# Patient Record
Sex: MALE | Race: WHITE | Employment: OTHER | URBAN - METROPOLITAN AREA
[De-identification: names, ages, dates, MRNs, and addresses within clinical notes are randomized per-mention and may not be internally consistent; named-entity substitution may affect disease eponyms.]

---

## 2017-02-28 RX ORDER — LANCETS
EACH MISCELLANEOUS
Qty: 1 BOX | Refills: 3 | Status: SHIPPED | OUTPATIENT
Start: 2017-02-28 | End: 2018-07-23

## 2017-02-28 RX ORDER — ASPIRIN 81 MG/1
1 TABLET ORAL DAILY
COMMUNITY
Start: 2014-01-30 | End: 2017-05-22

## 2017-02-28 RX ORDER — CALCIUM CARBONATE/VITAMIN D3 600 MG-10
1 TABLET ORAL 2 TIMES DAILY
COMMUNITY
Start: 2010-07-07 | End: 2018-11-28

## 2017-02-28 RX ORDER — EZETIMIBE 10 MG/1
1 TABLET ORAL DAILY
COMMUNITY
Start: 2007-05-16 | End: 2017-03-06

## 2017-02-28 RX ORDER — PRAVASTATIN SODIUM 80 MG/1
1 TABLET ORAL DAILY
COMMUNITY
Start: 2007-05-16 | End: 2017-05-22

## 2017-02-28 RX ORDER — FENOFIBRATE 160 MG/1
1 TABLET ORAL DAILY
COMMUNITY
Start: 2007-02-19 | End: 2017-07-24

## 2017-02-28 RX ORDER — QUINAPRIL 10 MG/1
1 TABLET ORAL DAILY
COMMUNITY
Start: 2007-01-26 | End: 2017-05-22

## 2017-03-02 RX ORDER — BLOOD SUGAR DIAGNOSTIC, DRUM
STRIP MISCELLANEOUS
Qty: 270 STRIP | Refills: 3 | Status: SHIPPED | OUTPATIENT
Start: 2017-03-02 | End: 2018-07-23

## 2017-03-06 RX ORDER — EZETIMIBE 10 MG/1
TABLET ORAL
Qty: 90 TABLET | Refills: 3 | Status: SHIPPED | OUTPATIENT
Start: 2017-03-06 | End: 2017-08-16

## 2017-03-13 ENCOUNTER — TELEPHONE (OUTPATIENT)
Dept: FAMILY MEDICINE CLINIC | Facility: CLINIC | Age: 82
End: 2017-03-13

## 2017-03-13 RX ORDER — PEN NEEDLE, DIABETIC 31 GX5/16"
NEEDLE, DISPOSABLE MISCELLANEOUS
Refills: 3 | COMMUNITY
Start: 2016-12-29 | End: 2017-03-21

## 2017-03-13 RX ORDER — INSULIN GLARGINE 100 [IU]/ML
INJECTION, SOLUTION SUBCUTANEOUS
Qty: 42 ML | Refills: 3 | Status: SHIPPED | OUTPATIENT
Start: 2017-03-13 | End: 2017-05-22 | Stop reason: DRUGHIGH

## 2017-03-13 RX ORDER — INSULIN LISPRO 100 [IU]/ML
INJECTION, SOLUTION INTRAVENOUS; SUBCUTANEOUS
Refills: 4 | COMMUNITY
Start: 2016-12-26 | End: 2018-01-13

## 2017-03-13 RX ORDER — HYDROCORTISONE AND IODOCHLORHYDROXYQUIN 5; 30 MG/G; MG/G
CREAM TOPICAL
Refills: 0 | COMMUNITY
Start: 2017-01-31 | End: 2017-05-22 | Stop reason: ALTCHOICE

## 2017-03-13 NOTE — TELEPHONE ENCOUNTER
Pt is currently taking 50 units of lantus per day. Dr. Karthikeyan Welch informed. Please sign encounter.

## 2017-03-22 RX ORDER — PEN NEEDLE, DIABETIC 31 GX5/16"
NEEDLE, DISPOSABLE MISCELLANEOUS
Qty: 360 EACH | Refills: 3 | Status: SHIPPED
Start: 2017-03-22 | End: 2018-01-27

## 2017-04-01 ENCOUNTER — LAB ENCOUNTER (OUTPATIENT)
Dept: LAB | Age: 82
End: 2017-04-01
Attending: FAMILY MEDICINE
Payer: MEDICARE

## 2017-04-01 DIAGNOSIS — E20.8 OTHER HYPOPARATHYROIDISM (HCC): ICD-10-CM

## 2017-04-01 DIAGNOSIS — E55.9 VITAMIN D DEFICIENCY, UNSPECIFIED: ICD-10-CM

## 2017-04-01 DIAGNOSIS — E66.3 OVERWEIGHT: ICD-10-CM

## 2017-04-01 DIAGNOSIS — I10 ESSENTIAL (PRIMARY) HYPERTENSION: ICD-10-CM

## 2017-04-01 DIAGNOSIS — N18.30 CHRONIC KIDNEY DISEASE, STAGE III (MODERATE) (HCC): ICD-10-CM

## 2017-04-01 PROCEDURE — 83970 ASSAY OF PARATHORMONE: CPT

## 2017-04-01 PROCEDURE — 81003 URINALYSIS AUTO W/O SCOPE: CPT

## 2017-04-01 PROCEDURE — 80048 BASIC METABOLIC PNL TOTAL CA: CPT

## 2017-04-01 PROCEDURE — 85025 COMPLETE CBC W/AUTO DIFF WBC: CPT

## 2017-04-01 PROCEDURE — 83735 ASSAY OF MAGNESIUM: CPT

## 2017-04-01 PROCEDURE — 84550 ASSAY OF BLOOD/URIC ACID: CPT

## 2017-04-01 PROCEDURE — 82306 VITAMIN D 25 HYDROXY: CPT

## 2017-04-01 PROCEDURE — 82040 ASSAY OF SERUM ALBUMIN: CPT

## 2017-04-01 PROCEDURE — 83036 HEMOGLOBIN GLYCOSYLATED A1C: CPT

## 2017-04-01 PROCEDURE — 84100 ASSAY OF PHOSPHORUS: CPT

## 2017-04-03 ENCOUNTER — TELEPHONE (OUTPATIENT)
Dept: FAMILY MEDICINE CLINIC | Facility: CLINIC | Age: 82
End: 2017-04-03

## 2017-04-03 NOTE — TELEPHONE ENCOUNTER
----- Message from Wilma Bond MD sent at 4/2/2017 11:47 AM CDT -----  A1c is improved to 7.0, was 7.9. Rest of labs look good with exception of a slightly low vitamin D of 29. Please fax all these labs to Dr. Jia Garrett who ordered these tested.

## 2017-05-22 ENCOUNTER — OFFICE VISIT (OUTPATIENT)
Dept: FAMILY MEDICINE CLINIC | Facility: CLINIC | Age: 82
End: 2017-05-22

## 2017-05-22 VITALS
TEMPERATURE: 98 F | HEIGHT: 68 IN | OXYGEN SATURATION: 98 % | RESPIRATION RATE: 24 BRPM | WEIGHT: 238.63 LBS | BODY MASS INDEX: 36.17 KG/M2 | HEART RATE: 60 BPM | SYSTOLIC BLOOD PRESSURE: 136 MMHG | DIASTOLIC BLOOD PRESSURE: 60 MMHG

## 2017-05-22 DIAGNOSIS — J40 BRONCHITIS: Primary | ICD-10-CM

## 2017-05-22 PROBLEM — E66.01 SEVERE OBESITY (BMI 35.0-39.9) WITH COMORBIDITY (HCC): Chronic | Status: ACTIVE | Noted: 2017-05-22

## 2017-05-22 PROBLEM — E66.01 SEVERE OBESITY (BMI 35.0-39.9) WITH COMORBIDITY (HCC): Chronic | Status: RESOLVED | Noted: 2017-05-22 | Resolved: 2017-05-22

## 2017-05-22 PROCEDURE — 99214 OFFICE O/P EST MOD 30 MIN: CPT | Performed by: FAMILY MEDICINE

## 2017-05-22 RX ORDER — AZITHROMYCIN 250 MG/1
TABLET, FILM COATED ORAL
Qty: 6 TABLET | Refills: 0 | Status: SHIPPED | OUTPATIENT
Start: 2017-05-22 | End: 2017-08-16

## 2017-05-22 RX ORDER — EZETIMIBE 10 MG/1
10 TABLET ORAL EVERY EVENING
COMMUNITY
End: 2017-11-15

## 2017-05-22 RX ORDER — NIACIN 500 MG
500 TABLET ORAL DAILY
COMMUNITY
End: 2017-08-16

## 2017-05-22 RX ORDER — FENOFIBRATE 145 MG/1
145 TABLET, COATED ORAL DAILY
COMMUNITY
End: 2019-02-22

## 2017-05-22 RX ORDER — INSULIN LISPRO 100 [IU]/ML
INJECTION, SOLUTION INTRAVENOUS; SUBCUTANEOUS
COMMUNITY
End: 2017-08-16

## 2017-05-22 RX ORDER — QUINAPRIL 10 MG/1
10 TABLET ORAL DAILY
COMMUNITY
End: 2017-10-31

## 2017-05-22 RX ORDER — DIPHENHYDRAMINE HCL 25 MG
25 CAPSULE ORAL
COMMUNITY

## 2017-05-22 RX ORDER — PRAVASTATIN SODIUM 80 MG/1
80 TABLET ORAL EVERY EVENING
COMMUNITY
End: 2017-10-08

## 2017-05-22 RX ORDER — CHOLECALCIFEROL (VITAMIN D3) 50 MCG
2000 TABLET ORAL DAILY
COMMUNITY
End: 2021-08-31

## 2017-05-22 NOTE — PROGRESS NOTES
[facility] [place]  PROGRESS NOTE  Chief Complaint:   Patient presents with:  Cough: 2 months      HPI:   This is a 80year old male coming in for persistent cough for the past 1-2 months.   Patient states that he was feeling well and then noted HgbA1C 7.0 (H) <5.7 %   Estimated Average Glucose 154 (H)  mg/dL   -CBC W/ DIFFERENTIAL   Result Value Ref Range   WBC 5.7 4.0-13.0 x10(3) uL   RBC 5.26 3.80-5.80 x10(6)uL   HGB 15.6 13.0-17.0 g/dL   HCT 47.6 37.0-53.0 %   .0 150.0-450.0 10( HCl (BENADRYL) 25 MG Oral Cap Take 25 mg by mouth. 2 daily-  One cap with dinner and one cap at bedtime Disp:  Rfl:    Multiple Vitamins-Minerals (MULTIVITAMIN ADULT OR) Take by mouth.  One daily Disp:  Rfl:    aspirin 81 MG Oral Tab Take 81 mg by mouth cara Disp:  Rfl:    Fenofibrate 160 MG Oral Tab Take 1 tablet by mouth daily. Disp:  Rfl:       Counseling given: Not Answered       REVIEW OF SYSTEMS:   EENT:  See HPI 2  INTEGUMENTARY:  Denies rashes, itching, skin lesion, or excessive skin dryness.   Sienna Castillo nontender,     ASSESSMENT AND PLAN:   Yosi Hicks was seen today for cough. Diagnoses and all orders for this visit:    Bronchitis    Other orders  -     azithromycin (ZITHROMAX Z-TOBIN) 250 MG Oral Tab; Take two tablets by mouth today, then one tablet daily.

## 2017-07-24 RX ORDER — FENOFIBRATE 160 MG/1
TABLET ORAL
Qty: 90 TABLET | Refills: 0 | Status: SHIPPED | OUTPATIENT
Start: 2017-07-24 | End: 2017-08-16

## 2017-07-28 ENCOUNTER — TELEPHONE (OUTPATIENT)
Dept: FAMILY MEDICINE CLINIC | Facility: CLINIC | Age: 82
End: 2017-07-28

## 2017-07-28 DIAGNOSIS — Z79.4 CONTROLLED TYPE 2 DIABETES MELLITUS WITHOUT COMPLICATION, WITH LONG-TERM CURRENT USE OF INSULIN (HCC): ICD-10-CM

## 2017-07-28 DIAGNOSIS — E11.9 CONTROLLED TYPE 2 DIABETES MELLITUS WITHOUT COMPLICATION, WITH LONG-TERM CURRENT USE OF INSULIN (HCC): ICD-10-CM

## 2017-07-28 DIAGNOSIS — E78.5 HYPERLIPIDEMIA, UNSPECIFIED HYPERLIPIDEMIA TYPE: Primary | ICD-10-CM

## 2017-08-12 ENCOUNTER — APPOINTMENT (OUTPATIENT)
Dept: LAB | Age: 82
End: 2017-08-12
Attending: FAMILY MEDICINE
Payer: MEDICARE

## 2017-08-12 DIAGNOSIS — Z79.4 CONTROLLED TYPE 2 DIABETES MELLITUS WITHOUT COMPLICATION, WITH LONG-TERM CURRENT USE OF INSULIN (HCC): ICD-10-CM

## 2017-08-12 DIAGNOSIS — E11.9 CONTROLLED TYPE 2 DIABETES MELLITUS WITHOUT COMPLICATION, WITH LONG-TERM CURRENT USE OF INSULIN (HCC): ICD-10-CM

## 2017-08-12 DIAGNOSIS — E78.5 HYPERLIPIDEMIA, UNSPECIFIED HYPERLIPIDEMIA TYPE: ICD-10-CM

## 2017-08-12 LAB
ALBUMIN SERPL-MCNC: 3.6 G/DL (ref 3.5–4.8)
ALP LIVER SERPL-CCNC: 50 U/L (ref 45–117)
ALT SERPL-CCNC: 42 U/L (ref 17–63)
AST SERPL-CCNC: 26 U/L (ref 15–41)
BILIRUB SERPL-MCNC: 0.5 MG/DL (ref 0.1–2)
BUN BLD-MCNC: 17 MG/DL (ref 8–20)
CALCIUM BLD-MCNC: 8.9 MG/DL (ref 8.3–10.3)
CHLORIDE: 107 MMOL/L (ref 101–111)
CHOLEST SMN-MCNC: 97 MG/DL (ref ?–200)
CO2: 26 MMOL/L (ref 22–32)
CREAT BLD-MCNC: 1.24 MG/DL (ref 0.7–1.3)
EST. AVERAGE GLUCOSE BLD GHB EST-MCNC: 163 MG/DL (ref 68–126)
GLUCOSE BLD-MCNC: 139 MG/DL (ref 70–99)
HBA1C MFR BLD HPLC: 7.3 % (ref ?–5.7)
HDLC SERPL-MCNC: 33 MG/DL (ref 45–?)
HDLC SERPL: 2.94 {RATIO} (ref ?–4.97)
LDLC SERPL CALC-MCNC: 43 MG/DL (ref ?–130)
LDLC SERPL-MCNC: 21 MG/DL (ref 5–40)
M PROTEIN MFR SERPL ELPH: 6.7 G/DL (ref 6.1–8.3)
NONHDLC SERPL-MCNC: 64 MG/DL (ref ?–130)
POTASSIUM SERPL-SCNC: 4.5 MMOL/L (ref 3.6–5.1)
SODIUM SERPL-SCNC: 141 MMOL/L (ref 136–144)
TRIGLYCERIDES: 107 MG/DL (ref ?–150)

## 2017-08-12 PROCEDURE — 80053 COMPREHEN METABOLIC PANEL: CPT | Performed by: FAMILY MEDICINE

## 2017-08-12 PROCEDURE — 36415 COLL VENOUS BLD VENIPUNCTURE: CPT | Performed by: FAMILY MEDICINE

## 2017-08-12 PROCEDURE — 80061 LIPID PANEL: CPT | Performed by: FAMILY MEDICINE

## 2017-08-12 PROCEDURE — 83036 HEMOGLOBIN GLYCOSYLATED A1C: CPT | Performed by: FAMILY MEDICINE

## 2017-08-16 ENCOUNTER — OFFICE VISIT (OUTPATIENT)
Dept: FAMILY MEDICINE CLINIC | Facility: CLINIC | Age: 82
End: 2017-08-16

## 2017-08-16 VITALS
RESPIRATION RATE: 24 BRPM | DIASTOLIC BLOOD PRESSURE: 72 MMHG | WEIGHT: 240 LBS | TEMPERATURE: 97 F | HEIGHT: 68 IN | SYSTOLIC BLOOD PRESSURE: 138 MMHG | HEART RATE: 64 BPM | BODY MASS INDEX: 36.37 KG/M2

## 2017-08-16 DIAGNOSIS — Z79.4 CONTROLLED TYPE 2 DIABETES MELLITUS WITHOUT COMPLICATION, WITH LONG-TERM CURRENT USE OF INSULIN (HCC): ICD-10-CM

## 2017-08-16 DIAGNOSIS — I25.9 CHRONIC ISCHEMIC HEART DISEASE: ICD-10-CM

## 2017-08-16 DIAGNOSIS — E11.9 CONTROLLED TYPE 2 DIABETES MELLITUS WITHOUT COMPLICATION, WITH LONG-TERM CURRENT USE OF INSULIN (HCC): ICD-10-CM

## 2017-08-16 DIAGNOSIS — E78.5 HYPERLIPIDEMIA, UNSPECIFIED HYPERLIPIDEMIA TYPE: ICD-10-CM

## 2017-08-16 DIAGNOSIS — R35.0 BENIGN PROSTATIC HYPERPLASIA WITH URINARY FREQUENCY: ICD-10-CM

## 2017-08-16 DIAGNOSIS — N40.1 BENIGN PROSTATIC HYPERPLASIA WITH URINARY FREQUENCY: ICD-10-CM

## 2017-08-16 DIAGNOSIS — Z00.00 ENCOUNTER FOR ANNUAL HEALTH EXAMINATION: ICD-10-CM

## 2017-08-16 DIAGNOSIS — N18.30 CHRONIC KIDNEY DISEASE, STAGE III (MODERATE) (HCC): ICD-10-CM

## 2017-08-16 DIAGNOSIS — Z00.00 HEALTH CARE MAINTENANCE: Primary | ICD-10-CM

## 2017-08-16 DIAGNOSIS — I10 ESSENTIAL HYPERTENSION: ICD-10-CM

## 2017-08-16 PROCEDURE — 99397 PER PM REEVAL EST PAT 65+ YR: CPT | Performed by: FAMILY MEDICINE

## 2017-08-16 PROCEDURE — G0439 PPPS, SUBSEQ VISIT: HCPCS | Performed by: FAMILY MEDICINE

## 2017-08-16 PROCEDURE — 96160 PT-FOCUSED HLTH RISK ASSMT: CPT | Performed by: FAMILY MEDICINE

## 2017-08-16 NOTE — PROGRESS NOTES
[address]  PROGRESS NOTE  Chief Complaint:   Patient presents with: Well Adult      HPI:   This is a [de-identified]year old male coming in for general wellness check and recheck of problems of. Overall the patient is been quite stable.   He catrachito 4.5 3.6 - 5.1 mmol/L   Chloride 107 101 - 111 mmol/L   CO2 26.0 22.0 - 32.0 mmol/L   -LIPID PANEL   Result Value Ref Range   Cholesterol, Total 97 <200 mg/dL   Triglycerides 107 <150 mg/dL   HDL Cholesterol 33 (L) >45 mg/dL   LDL Cholesterol 43 <130 mg/dL Take 10 mg by mouth every evening. Disp:  Rfl:    insulin glargine (LANTUS) 100 UNIT/ML Subcutaneous Solution Inject 50 Units into the skin every evening. Disp:  Rfl:    Fenofibrate (TRICOR) 145 MG Oral Tab Take 145 mg by mouth daily.  Disp:  Rfl:    metopr paralysis, ataxia, numbness or tingling in the extremities,change in bowel or bladder control. HEMATOLOGIC:  Denies anemia, bleeding or bruising. LYMPHATICS:  Denies enlarged nodes or history of splenectomy. PSYCHIATRIC:  Denies depression or anxiety. same medications and cardiac rehab  Hypertension: Continue with same medication and watching salt  Hyperlipidemia: Continue with same medications and watching diet  Renal insufficiency: Stay hydrated and avoid NSAIDs  BPH: No treatment at this time as symp

## 2017-08-16 NOTE — PATIENT INSTRUCTIONS
Continue with same medications. Continue with cardiac rehab.   Continue to see a specialist.      Rossana Recio's SCREENING SCHEDULE   Tests on this list are recommended by your physician but may not be covered, or covered at this frequency, by your insu history    Colorectal Cancer Screening Covered up to Age 76     Colonoscopy Screen   Covered every 10 years- more often if abnormal There are no preventive care reminders to display for this patient.  Update Health Maintenance if applicable    Flex Sigmoido with metal- TD and TDaP Not covered by Medicare Part B) No orders found for this or any previous visit.  This may be covered with your prescription benefits, but Medicare does not cover unless Medically needed    Zoster (Not covered by Medicare Part B) No o

## 2017-09-21 NOTE — TELEPHONE ENCOUNTER
Future appt:    Last Appointment:  8/16/2017    Cholesterol, Total (mg/dL)   Date Value   08/12/2017 97   ----------  HDL Cholesterol (mg/dL)   Date Value   08/12/2017 33 (L)   ----------  LDL Cholesterol (mg/dL)   Date Value   08/12/2017 43   ----------

## 2017-10-09 RX ORDER — PRAVASTATIN SODIUM 80 MG/1
TABLET ORAL
Qty: 90 TABLET | Refills: 2 | Status: SHIPPED | OUTPATIENT
Start: 2017-10-09 | End: 2018-07-16

## 2017-10-09 NOTE — TELEPHONE ENCOUNTER
Future appt:  None   Last Appointment:  8/16/2017; Return in about 1 year (around 8/16/2018).      Cholesterol, Total (mg/dL)   Date Value   08/12/2017 97   ----------  HDL Cholesterol (mg/dL)   Date Value   08/12/2017 33 (L)   ----------  LDL Cholesterol (

## 2017-10-16 RX ORDER — FENOFIBRATE 160 MG/1
TABLET ORAL
Qty: 90 TABLET | Refills: 3 | Status: SHIPPED | OUTPATIENT
Start: 2017-10-16 | End: 2018-08-22

## 2017-10-16 NOTE — TELEPHONE ENCOUNTER
Future appt:     Your appointments     Date & Time Appointment Department [facility])    Oct 17, 2017  8:00 AM CDT Laboratory Visit with REF Driss Wills Reference [facility] ([facility])        Romina Nolen Reference Lab  ED[facility] Ref Lab [address]

## 2017-10-17 ENCOUNTER — LABORATORY ENCOUNTER (OUTPATIENT)
Dept: LAB | Age: 82
End: 2017-10-17
Attending: FAMILY MEDICINE
Payer: MEDICARE

## 2017-10-17 DIAGNOSIS — E20.8 OTHER HYPOPARATHYROIDISM (HCC): ICD-10-CM

## 2017-10-17 DIAGNOSIS — N18.30 CHRONIC KIDNEY DISEASE, STAGE III (MODERATE) (HCC): Primary | ICD-10-CM

## 2017-10-17 DIAGNOSIS — E55.9 AVITAMINOSIS D: ICD-10-CM

## 2017-10-17 DIAGNOSIS — I10 ESSENTIAL HYPERTENSION, MALIGNANT: ICD-10-CM

## 2017-10-17 DIAGNOSIS — E66.3 SEVERELY OVERWEIGHT: ICD-10-CM

## 2017-10-17 PROCEDURE — 83970 ASSAY OF PARATHORMONE: CPT

## 2017-10-17 PROCEDURE — 84550 ASSAY OF BLOOD/URIC ACID: CPT

## 2017-10-17 PROCEDURE — 36415 COLL VENOUS BLD VENIPUNCTURE: CPT

## 2017-10-17 PROCEDURE — 85025 COMPLETE CBC W/AUTO DIFF WBC: CPT

## 2017-10-17 PROCEDURE — 83735 ASSAY OF MAGNESIUM: CPT

## 2017-10-17 PROCEDURE — 82040 ASSAY OF SERUM ALBUMIN: CPT

## 2017-10-17 PROCEDURE — 81003 URINALYSIS AUTO W/O SCOPE: CPT

## 2017-10-17 PROCEDURE — 84100 ASSAY OF PHOSPHORUS: CPT

## 2017-10-17 PROCEDURE — 80048 BASIC METABOLIC PNL TOTAL CA: CPT

## 2017-11-01 RX ORDER — QUINAPRIL 10 MG/1
TABLET ORAL
Qty: 90 TABLET | Refills: 3 | Status: SHIPPED | OUTPATIENT
Start: 2017-11-01 | End: 2018-10-23

## 2017-11-15 RX ORDER — EZETIMIBE 10 MG/1
10 TABLET ORAL EVERY EVENING
Qty: 90 TABLET | Refills: 3 | Status: SHIPPED | OUTPATIENT
Start: 2017-11-15 | End: 2018-11-29

## 2018-01-13 RX ORDER — INSULIN LISPRO 100 [IU]/ML
INJECTION, SOLUTION INTRAVENOUS; SUBCUTANEOUS
Qty: 105 ML | Refills: 3 | Status: SHIPPED | OUTPATIENT
Start: 2018-01-13 | End: 2018-11-28

## 2018-01-29 RX ORDER — PEN NEEDLE, DIABETIC 31 GX5/16"
NEEDLE, DISPOSABLE MISCELLANEOUS
Qty: 400 EACH | Refills: 3 | Status: SHIPPED | OUTPATIENT
Start: 2018-01-29 | End: 2019-02-25

## 2018-02-10 RX ORDER — INSULIN GLARGINE 100 [IU]/ML
INJECTION, SOLUTION SUBCUTANEOUS
Qty: 45 ML | Refills: 2 | Status: SHIPPED | OUTPATIENT
Start: 2018-02-10 | End: 2018-10-17

## 2018-02-20 ENCOUNTER — TELEPHONE (OUTPATIENT)
Dept: FAMILY MEDICINE CLINIC | Facility: CLINIC | Age: 83
End: 2018-02-20

## 2018-02-20 NOTE — TELEPHONE ENCOUNTER
Patient received Computer Phone Call to schedule appt. Is not due for Physical until [facility] August 2018.   Raine Kirby, 02/20/18, 11:37 AM

## 2018-05-01 ENCOUNTER — LABORATORY ENCOUNTER (OUTPATIENT)
Dept: LAB | Age: 83
End: 2018-05-01
Attending: FAMILY MEDICINE
Payer: MEDICARE

## 2018-05-01 DIAGNOSIS — N18.30 CHRONIC KIDNEY DISEASE, STAGE III (MODERATE) (HCC): Primary | ICD-10-CM

## 2018-05-01 DIAGNOSIS — I10 ESSENTIAL HYPERTENSION: ICD-10-CM

## 2018-05-01 DIAGNOSIS — E20.8 OTHER HYPOPARATHYROIDISM (HCC): ICD-10-CM

## 2018-05-01 DIAGNOSIS — E66.3 OVER WEIGHT: ICD-10-CM

## 2018-05-01 DIAGNOSIS — E55.9 VITAMIN D DEFICIENCY: ICD-10-CM

## 2018-05-01 PROCEDURE — 83735 ASSAY OF MAGNESIUM: CPT

## 2018-05-01 PROCEDURE — 85025 COMPLETE CBC W/AUTO DIFF WBC: CPT

## 2018-05-01 PROCEDURE — 84100 ASSAY OF PHOSPHORUS: CPT

## 2018-05-01 PROCEDURE — 80048 BASIC METABOLIC PNL TOTAL CA: CPT

## 2018-05-01 PROCEDURE — 81003 URINALYSIS AUTO W/O SCOPE: CPT

## 2018-05-01 PROCEDURE — 82040 ASSAY OF SERUM ALBUMIN: CPT

## 2018-05-01 PROCEDURE — 84550 ASSAY OF BLOOD/URIC ACID: CPT

## 2018-05-01 PROCEDURE — 83970 ASSAY OF PARATHORMONE: CPT

## 2018-05-01 PROCEDURE — 36415 COLL VENOUS BLD VENIPUNCTURE: CPT

## 2018-06-11 ENCOUNTER — TELEPHONE (OUTPATIENT)
Dept: FAMILY MEDICINE CLINIC | Facility: CLINIC | Age: 83
End: 2018-06-11

## 2018-06-11 DIAGNOSIS — Z79.4 CONTROLLED TYPE 2 DIABETES MELLITUS WITHOUT COMPLICATION, WITH LONG-TERM CURRENT USE OF INSULIN (HCC): Primary | ICD-10-CM

## 2018-06-11 DIAGNOSIS — E11.9 CONTROLLED TYPE 2 DIABETES MELLITUS WITHOUT COMPLICATION, WITH LONG-TERM CURRENT USE OF INSULIN (HCC): Primary | ICD-10-CM

## 2018-06-11 DIAGNOSIS — E78.5 HYPERLIPIDEMIA, UNSPECIFIED HYPERLIPIDEMIA TYPE: ICD-10-CM

## 2018-06-11 DIAGNOSIS — N40.1 BENIGN PROSTATIC HYPERPLASIA WITH LOWER URINARY TRACT SYMPTOMS, SYMPTOM DETAILS UNSPECIFIED: ICD-10-CM

## 2018-06-11 DIAGNOSIS — I10 ESSENTIAL HYPERTENSION: ICD-10-CM

## 2018-07-16 RX ORDER — PRAVASTATIN SODIUM 80 MG/1
TABLET ORAL
Qty: 90 TABLET | Refills: 1 | Status: SHIPPED | OUTPATIENT
Start: 2018-07-16 | End: 2019-01-11

## 2018-07-16 NOTE — TELEPHONE ENCOUNTER
Future appt:     Your appointments     Date & Time Appointment Department [facility])    Aug 16, 2018  8:15 AM CDT Laboratory Visit with REF Jessa Patel Reference Lab ([facility] Ref Lab [facility])    Aug 22, 2018  1:30 PM CDT MA Supervisit with Kinza Rajan,

## 2018-07-23 RX ORDER — LANCETS
EACH MISCELLANEOUS
Qty: 300 EACH | Refills: 1 | Status: SHIPPED
Start: 2018-07-23 | End: 2018-07-24

## 2018-07-23 RX ORDER — BLOOD SUGAR DIAGNOSTIC, DRUM
STRIP MISCELLANEOUS
Qty: 300 STRIP | Refills: 1 | Status: SHIPPED | OUTPATIENT
Start: 2018-07-23 | End: 2018-07-24

## 2018-07-23 NOTE — TELEPHONE ENCOUNTER
Future appt:     Your appointments     Date & Time Appointment Department [facility])    Aug 16, 2018  8:15 AM CDT Laboratory Visit with REF Chuy Oliveira Reference Lab ([facility] Ref Lab [facility])    Aug 22, 2018  1:30 PM CDT MA Supervisit with Hayes Christensen,

## 2018-07-24 ENCOUNTER — TELEPHONE (OUTPATIENT)
Dept: FAMILY MEDICINE CLINIC | Facility: CLINIC | Age: 83
End: 2018-07-24

## 2018-07-24 RX ORDER — BLOOD SUGAR DIAGNOSTIC, DRUM
STRIP MISCELLANEOUS
Qty: 300 STRIP | Refills: 1 | Status: SHIPPED | OUTPATIENT
Start: 2018-07-24 | End: 2019-08-28

## 2018-07-24 RX ORDER — LANCETS
EACH MISCELLANEOUS
Qty: 300 EACH | Refills: 1 | Status: SHIPPED | OUTPATIENT
Start: 2018-07-24 | End: 2019-08-28

## 2018-07-24 NOTE — TELEPHONE ENCOUNTER
Future appt:     Your appointments     Date & Time Appointment Department [facility])    Aug 16, 2018  8:15 AM CDT Laboratory Visit with REF Ramya Underwood Reference Lab (EDW Ref Lab [facility])    Aug 22, 2018  1:30 PM CDT MA Supervisit with Romaine Hernandez,

## 2018-07-28 NOTE — TELEPHONE ENCOUNTER
Future appt:     Your appointments     Date & Time Appointment Department [facility])    Aug 16, 2018  8:15 AM CDT Laboratory Visit with REF Sandra Rainey Reference Lab ([facility] Ref Lab [facility])    Aug 22, 2018  1:30 PM CDT MA Supervisit with Sarah Pardo,

## 2018-08-16 ENCOUNTER — LABORATORY ENCOUNTER (OUTPATIENT)
Dept: LAB | Age: 83
End: 2018-08-16
Attending: FAMILY MEDICINE
Payer: MEDICARE

## 2018-08-16 DIAGNOSIS — Z79.4 CONTROLLED TYPE 2 DIABETES MELLITUS WITHOUT COMPLICATION, WITH LONG-TERM CURRENT USE OF INSULIN (HCC): ICD-10-CM

## 2018-08-16 DIAGNOSIS — I10 ESSENTIAL HYPERTENSION: ICD-10-CM

## 2018-08-16 DIAGNOSIS — R79.89 ELEVATED TSH: ICD-10-CM

## 2018-08-16 DIAGNOSIS — E78.5 HYPERLIPIDEMIA, UNSPECIFIED HYPERLIPIDEMIA TYPE: ICD-10-CM

## 2018-08-16 DIAGNOSIS — N40.1 BENIGN PROSTATIC HYPERPLASIA WITH LOWER URINARY TRACT SYMPTOMS, SYMPTOM DETAILS UNSPECIFIED: ICD-10-CM

## 2018-08-16 DIAGNOSIS — E11.9 CONTROLLED TYPE 2 DIABETES MELLITUS WITHOUT COMPLICATION, WITH LONG-TERM CURRENT USE OF INSULIN (HCC): ICD-10-CM

## 2018-08-16 LAB
ALBUMIN SERPL-MCNC: 3.7 G/DL (ref 3.5–4.8)
ALBUMIN/GLOB SERPL: 1.2 {RATIO} (ref 1–2)
ALP LIVER SERPL-CCNC: 50 U/L (ref 45–117)
ALT SERPL-CCNC: 44 U/L (ref 17–63)
ANION GAP SERPL CALC-SCNC: 7 MMOL/L (ref 0–18)
AST SERPL-CCNC: 26 U/L (ref 15–41)
BASOPHILS # BLD AUTO: 0.05 X10(3) UL (ref 0–0.1)
BASOPHILS NFR BLD AUTO: 0.8 %
BILIRUB SERPL-MCNC: 0.5 MG/DL (ref 0.1–2)
BILIRUB UR QL STRIP.AUTO: NEGATIVE
BUN BLD-MCNC: 18 MG/DL (ref 8–20)
BUN/CREAT SERPL: 14 (ref 10–20)
CALCIUM BLD-MCNC: 9 MG/DL (ref 8.3–10.3)
CHLORIDE SERPL-SCNC: 105 MMOL/L (ref 101–111)
CHOLEST SMN-MCNC: 118 MG/DL (ref ?–200)
CLARITY UR REFRACT.AUTO: CLEAR
CO2 SERPL-SCNC: 27 MMOL/L (ref 22–32)
COLOR UR AUTO: YELLOW
CREAT BLD-MCNC: 1.29 MG/DL (ref 0.7–1.3)
CREAT UR-SCNC: 157 MG/DL
EOSINOPHIL # BLD AUTO: 0.12 X10(3) UL (ref 0–0.3)
EOSINOPHIL NFR BLD AUTO: 2 %
ERYTHROCYTE [DISTWIDTH] IN BLOOD BY AUTOMATED COUNT: 13.3 % (ref 11.5–16)
EST. AVERAGE GLUCOSE BLD GHB EST-MCNC: 160 MG/DL (ref 68–126)
GLOBULIN PLAS-MCNC: 3.2 G/DL (ref 2.5–4)
GLUCOSE BLD-MCNC: 148 MG/DL (ref 70–99)
GLUCOSE UR STRIP.AUTO-MCNC: NEGATIVE MG/DL
HBA1C MFR BLD HPLC: 7.2 % (ref ?–5.7)
HCT VFR BLD AUTO: 50.9 % (ref 37–53)
HDLC SERPL-MCNC: 33 MG/DL (ref 40–59)
HGB BLD-MCNC: 16.5 G/DL (ref 13–17)
IMMATURE GRANULOCYTE COUNT: 0.03 X10(3) UL (ref 0–1)
IMMATURE GRANULOCYTE RATIO %: 0.5 %
KETONES UR STRIP.AUTO-MCNC: NEGATIVE MG/DL
LDLC SERPL CALC-MCNC: 57 MG/DL (ref ?–100)
LEUKOCYTE ESTERASE UR QL STRIP.AUTO: NEGATIVE
LYMPHOCYTES # BLD AUTO: 1.43 X10(3) UL (ref 0.9–4)
LYMPHOCYTES NFR BLD AUTO: 23.9 %
M PROTEIN MFR SERPL ELPH: 6.9 G/DL (ref 6.1–8.3)
MCH RBC QN AUTO: 29.5 PG (ref 27–33.2)
MCHC RBC AUTO-ENTMCNC: 32.4 G/DL (ref 31–37)
MCV RBC AUTO: 91.1 FL (ref 80–99)
MICROALBUMIN UR-MCNC: 0.68 MG/DL
MICROALBUMIN/CREAT 24H UR-RTO: 4.3 UG/MG (ref ?–30)
MONOCYTES # BLD AUTO: 0.56 X10(3) UL (ref 0.1–1)
MONOCYTES NFR BLD AUTO: 9.4 %
NEUTROPHIL ABS PRELIM: 3.79 X10 (3) UL (ref 1.3–6.7)
NEUTROPHILS # BLD AUTO: 3.79 X10(3) UL (ref 1.3–6.7)
NEUTROPHILS NFR BLD AUTO: 63.4 %
NITRITE UR QL STRIP.AUTO: NEGATIVE
NONHDLC SERPL-MCNC: 85 MG/DL (ref ?–130)
OSMOLALITY SERPL CALC.SUM OF ELEC: 293 MOSM/KG (ref 275–295)
PH UR STRIP.AUTO: 6 [PH] (ref 4.5–8)
PLATELET # BLD AUTO: 199 10(3)UL (ref 150–450)
POTASSIUM SERPL-SCNC: 4.6 MMOL/L (ref 3.6–5.1)
PROT UR STRIP.AUTO-MCNC: NEGATIVE MG/DL
PSA SERPL-MCNC: 0.32 NG/ML (ref 0.01–4)
RBC # BLD AUTO: 5.59 X10(6)UL (ref 3.8–5.8)
RBC UR QL AUTO: NEGATIVE
RED CELL DISTRIBUTION WIDTH-SD: 44.5 FL (ref 35.1–46.3)
SODIUM SERPL-SCNC: 139 MMOL/L (ref 136–144)
SP GR UR STRIP.AUTO: 1.02 (ref 1–1.03)
TRIGL SERPL-MCNC: 139 MG/DL (ref 30–149)
TSI SER-ACNC: 5.66 MIU/ML (ref 0.35–5.5)
UROBILINOGEN UR STRIP.AUTO-MCNC: <2 MG/DL
VLDLC SERPL CALC-MCNC: 28 MG/DL (ref 0–30)
WBC # BLD AUTO: 6 X10(3) UL (ref 4–13)

## 2018-08-16 PROCEDURE — 80053 COMPREHEN METABOLIC PANEL: CPT

## 2018-08-16 PROCEDURE — 83036 HEMOGLOBIN GLYCOSYLATED A1C: CPT

## 2018-08-16 PROCEDURE — 36415 COLL VENOUS BLD VENIPUNCTURE: CPT

## 2018-08-16 PROCEDURE — 82043 UR ALBUMIN QUANTITATIVE: CPT

## 2018-08-16 PROCEDURE — 81003 URINALYSIS AUTO W/O SCOPE: CPT

## 2018-08-16 PROCEDURE — 80061 LIPID PANEL: CPT

## 2018-08-16 PROCEDURE — 82570 ASSAY OF URINE CREATININE: CPT

## 2018-08-16 PROCEDURE — 85025 COMPLETE CBC W/AUTO DIFF WBC: CPT

## 2018-08-16 PROCEDURE — 84443 ASSAY THYROID STIM HORMONE: CPT

## 2018-08-16 PROCEDURE — 84153 ASSAY OF PSA TOTAL: CPT

## 2018-08-16 PROCEDURE — 84439 ASSAY OF FREE THYROXINE: CPT

## 2018-08-17 ENCOUNTER — MED REC SCAN ONLY (OUTPATIENT)
Dept: FAMILY MEDICINE CLINIC | Facility: CLINIC | Age: 83
End: 2018-08-17

## 2018-08-17 DIAGNOSIS — R79.89 ELEVATED TSH: Primary | ICD-10-CM

## 2018-08-17 LAB — T4 FREE SERPL-MCNC: 0.8 NG/DL (ref 0.9–1.8)

## 2018-08-22 ENCOUNTER — OFFICE VISIT (OUTPATIENT)
Dept: FAMILY MEDICINE CLINIC | Facility: CLINIC | Age: 83
End: 2018-08-22
Payer: COMMERCIAL

## 2018-08-22 VITALS
DIASTOLIC BLOOD PRESSURE: 58 MMHG | HEART RATE: 64 BPM | RESPIRATION RATE: 18 BRPM | HEIGHT: 68 IN | TEMPERATURE: 98 F | SYSTOLIC BLOOD PRESSURE: 118 MMHG | WEIGHT: 240 LBS | BODY MASS INDEX: 36.37 KG/M2

## 2018-08-22 DIAGNOSIS — N18.30 CHRONIC KIDNEY DISEASE, STAGE III (MODERATE) (HCC): ICD-10-CM

## 2018-08-22 DIAGNOSIS — N40.1 BENIGN PROSTATIC HYPERPLASIA WITH LOWER URINARY TRACT SYMPTOMS, SYMPTOM DETAILS UNSPECIFIED: ICD-10-CM

## 2018-08-22 DIAGNOSIS — Z00.00 HEALTH CARE MAINTENANCE: Primary | ICD-10-CM

## 2018-08-22 DIAGNOSIS — E11.9 CONTROLLED TYPE 2 DIABETES MELLITUS WITHOUT COMPLICATION, WITH LONG-TERM CURRENT USE OF INSULIN (HCC): ICD-10-CM

## 2018-08-22 DIAGNOSIS — I10 ESSENTIAL HYPERTENSION: ICD-10-CM

## 2018-08-22 DIAGNOSIS — Z79.4 CONTROLLED TYPE 2 DIABETES MELLITUS WITHOUT COMPLICATION, WITH LONG-TERM CURRENT USE OF INSULIN (HCC): ICD-10-CM

## 2018-08-22 DIAGNOSIS — I25.9 CHRONIC ISCHEMIC HEART DISEASE: ICD-10-CM

## 2018-08-22 DIAGNOSIS — Z00.00 ENCOUNTER FOR ANNUAL HEALTH EXAMINATION: ICD-10-CM

## 2018-08-22 DIAGNOSIS — E03.9 HYPOTHYROIDISM, UNSPECIFIED TYPE: ICD-10-CM

## 2018-08-22 DIAGNOSIS — E78.5 HYPERLIPIDEMIA, UNSPECIFIED HYPERLIPIDEMIA TYPE: ICD-10-CM

## 2018-08-22 PROBLEM — E66.3 SEVERELY OVERWEIGHT: Status: ACTIVE | Noted: 2018-08-22

## 2018-08-22 PROCEDURE — G0439 PPPS, SUBSEQ VISIT: HCPCS | Performed by: FAMILY MEDICINE

## 2018-08-22 PROCEDURE — 99397 PER PM REEVAL EST PAT 65+ YR: CPT | Performed by: FAMILY MEDICINE

## 2018-08-22 PROCEDURE — 96160 PT-FOCUSED HLTH RISK ASSMT: CPT | Performed by: FAMILY MEDICINE

## 2018-08-22 RX ORDER — NIACIN 500 MG
500 TABLET ORAL
COMMUNITY
End: 2019-02-22

## 2018-08-22 RX ORDER — LEVOTHYROXINE SODIUM 0.03 MG/1
TABLET ORAL
Qty: 90 TABLET | Refills: 1 | Status: SHIPPED | OUTPATIENT
Start: 2018-08-22 | End: 2018-11-08

## 2018-08-22 NOTE — PATIENT INSTRUCTIONS
Continue with same medication. Begin thyroid medication. Recheck thyroid levels in 2 months.     Orlando Recio's SCREENING SCHEDULE   Tests on this list are recommended by your physician but may not be covered, or covered at this frequency, by your insu history    Colorectal Cancer Screening Covered up to Age 76     Colonoscopy Screen   Covered every 10 years- more often if abnormal There are no preventive care reminders to display for this patient.  Update Health Maintenance if applicable    Flex Sigmoido with metal- TD and TDaP Not covered by Medicare Part B) No orders found for this or any previous visit.  This may be covered with your prescription benefits, but Medicare does not cover unless Medically needed    Zoster (Not covered by Medicare Part B) No o

## 2018-08-22 NOTE — PROGRESS NOTES
[facility] [place]  PROGRESS NOTE  Chief Complaint:   Patient presents with:  Physical      HPI:   This is a 82year old male coming in for general wellness and recheck of problems. This patient is doing well overall.   His history of coronary a mg/dL   -MICROALB/CREAT RATIO, RANDOM URINE   Result Value Ref Range   Microalbumin, Urine 0.68 mg/dL   Creatinine Ur Random 157.00 mg/dL   Malb/Cre Calc 4.3 <=30.0 ug/mg   -ASSAY, THYROID STIM HORMONE   Result Value Ref Range   TSH 5.660 (H) 0.350 - 5.500 Hyperlipidemia    • Hypertension    • Renal insufficiency      Past Surgical History:  No date: CATARACTS, OPHTHM ([facility])  2008: COLONOSCOPY  No date: OTHER      Comment: BCC with grafting face  No date: TONSILLECTOMY  Social History:  Smoking status: Never Subcutaneous Solution Pen-injector INJECT 30 UNITS BEFORE EACH MEAL AND 15 UNITS BEFORE SNACK - TOTAL 105 UNITS A DAY (Patient taking differently: INJECT 36 UNITS BEFORE EACH MEAL) Disp: 105 mL Rfl: 3   ezetimibe (ZETIA) 10 MG Oral Tab Take 1 tablet (10 mg blood in stool. MUSCULOSKELETAL:  Denies weakness, muscle aches, back pain, joint pain, swelling or stiffness.   NEUROLOGICAL:  Denies headache, seizures, dizziness, syncope, paralysis, ataxia, numbness or tingling in the extremities,change in bowel or sonny prostatic hyperplasia with lower urinary tract symptoms, symptom details unspecified    Essential hypertension    Chronic ischemic heart disease    Chronic kidney disease, stage III (moderate) (HCC)    Hypothyroidism, unspecified type  -     TSH+FREE T4; F Benign neoplasm of colon     Chronic ischemic heart disease     Diabetes mellitus type II, controlled ([address]     Edema     Familial multiple lipoprotein-type hyperlipidemia     Essential hypertension     Vitamin D deficiency     Severely overweight      CRISTINA

## 2018-09-12 ENCOUNTER — TELEPHONE (OUTPATIENT)
Dept: FAMILY MEDICINE CLINIC | Facility: CLINIC | Age: 83
End: 2018-09-12

## 2018-10-15 RX ORDER — LEVOTHYROXINE SODIUM 0.05 MG/1
50 TABLET ORAL
Qty: 30 TABLET | Refills: 3 | Status: SHIPPED | OUTPATIENT
Start: 2018-10-15 | End: 2018-11-08

## 2018-10-15 RX ORDER — FENOFIBRATE 160 MG/1
TABLET ORAL
Qty: 90 TABLET | Refills: 0 | Status: SHIPPED | OUTPATIENT
Start: 2018-10-15 | End: 2018-11-28

## 2018-10-15 NOTE — TELEPHONE ENCOUNTER
Future appt:     Your appointments     Date & Time Appointment Department [facility])    Nov 07, 2018  8:15 AM CST Laboratory Visit with TITUS Wesley Reference Lab ([facility] Ref Lab [facility])    Feb 22, 2019  2:00 PM CST Follow up with Aide Hung MD

## 2018-10-17 RX ORDER — INSULIN GLARGINE 100 [IU]/ML
INJECTION, SOLUTION SUBCUTANEOUS
Qty: 45 ML | Refills: 1 | Status: SHIPPED | OUTPATIENT
Start: 2018-10-17 | End: 2019-05-31

## 2018-10-17 NOTE — TELEPHONE ENCOUNTER
Future appt:     Your appointments     Date & Time Appointment Department [facility])    Nov 07, 2018  8:15 AM CST Laboratory Visit with REF Ramya Underwood [facility] ([facility] Ref [facility])    Feb 22, 2019  2:00 PM CST Follow up with Radha Porter MD

## 2018-10-23 NOTE — TELEPHONE ENCOUNTER
Future appt:     Your appointments     Date & Time Appointment Department [facility])    Nov 07, 2018  8:15 AM CST Laboratory Visit with REF Sully Price [facility] ([facility])    Feb 22, 2019  2:00 PM CST Follow up with Hansel Leventhal, MD

## 2018-10-24 RX ORDER — QUINAPRIL 10 MG/1
TABLET ORAL
Qty: 90 TABLET | Refills: 0 | Status: SHIPPED | OUTPATIENT
Start: 2018-10-24 | End: 2019-01-25

## 2018-11-07 ENCOUNTER — LABORATORY ENCOUNTER (OUTPATIENT)
Dept: LAB | Age: 83
End: 2018-11-07
Attending: FAMILY MEDICINE
Payer: MEDICARE

## 2018-11-07 DIAGNOSIS — I12.9 RENAL HYPERTENSION: ICD-10-CM

## 2018-11-07 DIAGNOSIS — E55.9 VITAMIN D DEFICIENCY, UNSPECIFIED: ICD-10-CM

## 2018-11-07 DIAGNOSIS — E20.8 OTHER HYPOPARATHYROIDISM (HCC): ICD-10-CM

## 2018-11-07 DIAGNOSIS — E66.3 OVER WEIGHT: ICD-10-CM

## 2018-11-07 DIAGNOSIS — E03.9 HYPOTHYROIDISM, UNSPECIFIED TYPE: ICD-10-CM

## 2018-11-07 DIAGNOSIS — N18.30 CHRONIC KIDNEY DISEASE, STAGE III (MODERATE) (HCC): ICD-10-CM

## 2018-11-07 DIAGNOSIS — I10 ESSENTIAL HYPERTENSION: ICD-10-CM

## 2018-11-07 PROCEDURE — 83735 ASSAY OF MAGNESIUM: CPT

## 2018-11-07 PROCEDURE — 84443 ASSAY THYROID STIM HORMONE: CPT

## 2018-11-07 PROCEDURE — 83970 ASSAY OF PARATHORMONE: CPT

## 2018-11-07 PROCEDURE — 84100 ASSAY OF PHOSPHORUS: CPT

## 2018-11-07 PROCEDURE — 82040 ASSAY OF SERUM ALBUMIN: CPT

## 2018-11-07 PROCEDURE — 84550 ASSAY OF BLOOD/URIC ACID: CPT

## 2018-11-07 PROCEDURE — 84439 ASSAY OF FREE THYROXINE: CPT

## 2018-11-07 PROCEDURE — 85025 COMPLETE CBC W/AUTO DIFF WBC: CPT

## 2018-11-07 PROCEDURE — 36415 COLL VENOUS BLD VENIPUNCTURE: CPT

## 2018-11-07 PROCEDURE — 80048 BASIC METABOLIC PNL TOTAL CA: CPT

## 2018-11-07 PROCEDURE — 81003 URINALYSIS AUTO W/O SCOPE: CPT

## 2018-11-08 ENCOUNTER — TELEPHONE (OUTPATIENT)
Dept: FAMILY MEDICINE CLINIC | Facility: CLINIC | Age: 83
End: 2018-11-08

## 2018-11-08 RX ORDER — LEVOTHYROXINE SODIUM 0.05 MG/1
50 TABLET ORAL
Qty: 90 TABLET | Refills: 1 | Status: SHIPPED | OUTPATIENT
Start: 2018-11-08 | End: 2019-04-24

## 2018-11-08 NOTE — TELEPHONE ENCOUNTER
Informed pt of his blood work results. Pt will need a refill on for the Levothyroxine. Pt states he read the instructions wrong. Pt was suppose to take 25mcg for one week and then 50mcg after that.     When pt received the refill he did not notice

## 2018-11-08 NOTE — TELEPHONE ENCOUNTER
----- Message from Cholo Gomez MD sent at 11/8/2018  7:09 AM CST -----  All labs look very good and normal.  Even kidney function tests are improved. Creatinine is in the normal range at 1.24.   Thyroid levels are now normal with starting levothyroxin

## 2018-11-28 ENCOUNTER — OFFICE VISIT (OUTPATIENT)
Dept: FAMILY MEDICINE CLINIC | Facility: CLINIC | Age: 83
End: 2018-11-28
Payer: COMMERCIAL

## 2018-11-28 VITALS
SYSTOLIC BLOOD PRESSURE: 126 MMHG | HEIGHT: 68 IN | TEMPERATURE: 97 F | WEIGHT: 239.5 LBS | DIASTOLIC BLOOD PRESSURE: 70 MMHG | RESPIRATION RATE: 16 BRPM | HEART RATE: 68 BPM | BODY MASS INDEX: 36.3 KG/M2

## 2018-11-28 DIAGNOSIS — Z79.4 CONTROLLED TYPE 2 DIABETES MELLITUS WITHOUT COMPLICATION, WITH LONG-TERM CURRENT USE OF INSULIN (HCC): ICD-10-CM

## 2018-11-28 DIAGNOSIS — R42 DIZZINESS: Primary | ICD-10-CM

## 2018-11-28 DIAGNOSIS — I10 ESSENTIAL HYPERTENSION: ICD-10-CM

## 2018-11-28 DIAGNOSIS — E11.9 CONTROLLED TYPE 2 DIABETES MELLITUS WITHOUT COMPLICATION, WITH LONG-TERM CURRENT USE OF INSULIN (HCC): ICD-10-CM

## 2018-11-28 PROCEDURE — 99214 OFFICE O/P EST MOD 30 MIN: CPT | Performed by: FAMILY MEDICINE

## 2018-11-28 RX ORDER — INSULIN LISPRO 100 [IU]/ML
38 INJECTION, SOLUTION INTRAVENOUS; SUBCUTANEOUS
COMMUNITY
End: 2019-02-25 | Stop reason: ALTCHOICE

## 2018-11-28 RX ORDER — METOPROLOL SUCCINATE 25 MG/1
25 TABLET, EXTENDED RELEASE ORAL DAILY
COMMUNITY
End: 2019-01-29

## 2018-11-28 NOTE — PROGRESS NOTES
[facility] [place]  PROGRESS NOTE  Chief Complaint:   Patient presents with:  Dizziness      HPI:   This is a 82year old male coming in for dizziness and off-balance sensation for the past few months but seems worse over the past few weeks.   Pa Yellow    Clarity Urine Clear Clear    Spec Gravity 1.018 1.001 - 1.030    Glucose Urine Negative Negative mg/dl    Bilirubin Urine Negative Negative    Ketones Urine Negative Negative mg/dL    Blood Urine Negative Negative    pH Urine 6.0 4.5 - 8.0    Pro History:  Family History   Problem Relation Age of Onset   • Other (CAD) Father          at 47 of an MI   • Breast Cancer Mother         Breast cancer and COPD     Allergies:    Atorvastatin                Comment:Other reaction(s):  Other (see Comments Multiple Vitamins-Minerals (MULTIVITAMIN ADULT OR) Take by mouth. One daily Disp:  Rfl:    aspirin 81 MG Oral Tab Take 81 mg by mouth daily. Disp:  Rfl:    Cholecalciferol (VITAMIN D) 2000 units Oral Tab Take 2,000 Units by mouth daily.  Dr. Farida Rodriguez: Supple, no JVD, no thyromegaly. SKIN: No rashes, no skin lesion, no bruising, good turgor. HEART:  Regular rate and rhythm, no murmurs, rubs or gallops. LUNGS: Clear to auscultation bilterally, no rales/rhonchi/wheezing. CHEST: No tenderness.   ABDOMEN: (moderate) (HCC)     Benign neoplasm of colon     Chronic ischemic heart disease     Diabetes mellitus type II, controlled ([facility] [address]     Edema     Familial multiple lipoprotein-type hyperlipidemia     Essential hypertension     Vitamin D deficiency     Severel

## 2018-11-29 ENCOUNTER — TELEPHONE (OUTPATIENT)
Dept: FAMILY MEDICINE CLINIC | Facility: CLINIC | Age: 83
End: 2018-11-29

## 2018-11-29 RX ORDER — EZETIMIBE 10 MG/1
TABLET ORAL
Qty: 90 TABLET | Refills: 0 | Status: SHIPPED | OUTPATIENT
Start: 2018-11-29 | End: 2019-01-28

## 2018-11-29 RX ORDER — MECLIZINE HCL 12.5 MG/1
12.5 TABLET ORAL 3 TIMES DAILY PRN
Qty: 30 TABLET | Refills: 0 | Status: SHIPPED | OUTPATIENT
Start: 2018-11-29

## 2018-11-29 NOTE — TELEPHONE ENCOUNTER
Patient states his dizziness is worse today. States he was not able to stay at cardiac rehab and when walking out to his car he became very dizzy and needed to be pushed the rest of the way in a wheelchair.   States since he has been home, the dizziness h

## 2018-11-29 NOTE — TELEPHONE ENCOUNTER
That I would like him to rest as much as possible, avoiding x-ray activity. I would like him to try a small dose of meclizine which would help if this is an inner ear disturbance. Prescription sent to [facility] in [place].   Please notify patient

## 2018-11-29 NOTE — TELEPHONE ENCOUNTER
Future appt:     Your appointments     Date & Time Appointment Department [facility])    Feb 22, 2019  2:00 PM CST Follow up with Helena Reid MD [address] [facility] [place] ([address], [facility] SPECIALTY [facility]

## 2018-12-05 ENCOUNTER — TELEPHONE (OUTPATIENT)
Dept: FAMILY MEDICINE CLINIC | Facility: CLINIC | Age: 83
End: 2018-12-05

## 2018-12-05 NOTE — PROGRESS NOTES
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 Discussed with patient. He had previously seen Dr. Edward Bosch and has already been contacted by him. He has been set up with a consult for vascular.

## 2019-01-11 RX ORDER — PRAVASTATIN SODIUM 80 MG/1
TABLET ORAL
Qty: 90 TABLET | Refills: 1 | Status: SHIPPED | OUTPATIENT
Start: 2019-01-11 | End: 2019-07-07

## 2019-01-11 RX ORDER — FENOFIBRATE 160 MG/1
TABLET ORAL
Qty: 90 TABLET | Refills: 1 | Status: SHIPPED | OUTPATIENT
Start: 2019-01-11 | End: 2019-02-22

## 2019-01-11 NOTE — TELEPHONE ENCOUNTER
Future appt:     Your appointments     Date & Time Appointment Department [facility])    Feb 22, 2019  2:00 PM CST Follow up with Conrad Pina MD 25 [facility], [address]

## 2019-01-25 RX ORDER — QUINAPRIL 10 MG/1
10 TABLET ORAL
Qty: 90 TABLET | Refills: 1 | Status: SHIPPED | OUTPATIENT
Start: 2019-01-25 | End: 2019-07-19

## 2019-01-28 ENCOUNTER — TELEPHONE (OUTPATIENT)
Dept: FAMILY MEDICINE CLINIC | Facility: CLINIC | Age: 84
End: 2019-01-28

## 2019-01-28 RX ORDER — EZETIMIBE 10 MG/1
TABLET ORAL
Qty: 90 TABLET | Refills: 1 | Status: SHIPPED | OUTPATIENT
Start: 2019-01-28 | End: 2019-02-22

## 2019-01-28 NOTE — TELEPHONE ENCOUNTER
Received a refill request for Zetia 10mg 1 daily.     Future Appointments   Date Time Provider Myla Gamez   2/22/2019  2:00 PM Dave Shirley MD [facility] [place] [facility]

## 2019-01-29 RX ORDER — METOPROLOL SUCCINATE 25 MG/1
25 TABLET, EXTENDED RELEASE ORAL DAILY
Qty: 90 TABLET | Refills: 1 | Status: SHIPPED | OUTPATIENT
Start: 2019-01-29 | End: 2019-02-01

## 2019-01-29 NOTE — TELEPHONE ENCOUNTER
Future appt:     Your appointments     Date & Time Appointment Department [facility])    Feb 22, 2019  2:00 PM CST Follow up with Dave Shirley MD 25 [facility] [place] ([facility])        [address], [facility] SPECIALTY [facility]

## 2019-02-01 ENCOUNTER — TELEPHONE (OUTPATIENT)
Dept: FAMILY MEDICINE CLINIC | Facility: CLINIC | Age: 84
End: 2019-02-01

## 2019-02-01 RX ORDER — METOPROLOL SUCCINATE 25 MG/1
25 TABLET, EXTENDED RELEASE ORAL DAILY
Qty: 90 TABLET | Refills: 1 | Status: CANCELLED | OUTPATIENT
Start: 2019-02-01

## 2019-02-01 RX ORDER — METOPROLOL SUCCINATE 25 MG/1
25 TABLET, EXTENDED RELEASE ORAL 2 TIMES DAILY
Qty: 180 TABLET | Refills: 1 | Status: SHIPPED | OUTPATIENT
Start: 2019-02-01 | End: 2019-09-01

## 2019-02-13 ENCOUNTER — MED REC SCAN ONLY (OUTPATIENT)
Dept: FAMILY MEDICINE CLINIC | Facility: CLINIC | Age: 84
End: 2019-02-13

## 2019-02-22 ENCOUNTER — OFFICE VISIT (OUTPATIENT)
Dept: FAMILY MEDICINE CLINIC | Facility: CLINIC | Age: 84
End: 2019-02-22
Payer: COMMERCIAL

## 2019-02-22 ENCOUNTER — APPOINTMENT (OUTPATIENT)
Dept: LAB | Age: 84
End: 2019-02-22
Attending: FAMILY MEDICINE
Payer: MEDICARE

## 2019-02-22 VITALS
SYSTOLIC BLOOD PRESSURE: 142 MMHG | HEIGHT: 68 IN | HEART RATE: 74 BPM | DIASTOLIC BLOOD PRESSURE: 62 MMHG | BODY MASS INDEX: 36.65 KG/M2 | RESPIRATION RATE: 18 BRPM | TEMPERATURE: 97 F | WEIGHT: 241.81 LBS

## 2019-02-22 DIAGNOSIS — I25.10 ATHEROSCLEROSIS OF NATIVE CORONARY ARTERY OF NATIVE HEART WITHOUT ANGINA PECTORIS: ICD-10-CM

## 2019-02-22 DIAGNOSIS — E66.3 SEVERELY OVERWEIGHT: ICD-10-CM

## 2019-02-22 DIAGNOSIS — N18.30 CHRONIC RENAL INSUFFICIENCY, STAGE III (MODERATE) (HCC): ICD-10-CM

## 2019-02-22 DIAGNOSIS — I65.23 BILATERAL CAROTID ARTERY OCCLUSION: ICD-10-CM

## 2019-02-22 DIAGNOSIS — I10 ESSENTIAL HYPERTENSION, BENIGN: ICD-10-CM

## 2019-02-22 DIAGNOSIS — N18.30 TYPE 2 DIABETES MELLITUS WITH STAGE 3 CHRONIC KIDNEY DISEASE, WITH LONG-TERM CURRENT USE OF INSULIN (HCC): Primary | ICD-10-CM

## 2019-02-22 DIAGNOSIS — E78.01 FAMILIAL HYPERCHOLESTEROLEMIA: ICD-10-CM

## 2019-02-22 DIAGNOSIS — Z79.4 TYPE 2 DIABETES MELLITUS WITH STAGE 3 CHRONIC KIDNEY DISEASE, WITH LONG-TERM CURRENT USE OF INSULIN (HCC): Primary | ICD-10-CM

## 2019-02-22 DIAGNOSIS — R26.81 UNSTEADY: ICD-10-CM

## 2019-02-22 DIAGNOSIS — E55.9 VITAMIN D DEFICIENCY: ICD-10-CM

## 2019-02-22 DIAGNOSIS — E20.8 OTHER HYPOPARATHYROIDISM (HCC): ICD-10-CM

## 2019-02-22 DIAGNOSIS — E11.22 TYPE 2 DIABETES MELLITUS WITH STAGE 3 CHRONIC KIDNEY DISEASE, WITH LONG-TERM CURRENT USE OF INSULIN (HCC): Primary | ICD-10-CM

## 2019-02-22 PROBLEM — E20.89 OTHER HYPOPARATHYROIDISM: Status: ACTIVE | Noted: 2019-02-22

## 2019-02-22 PROBLEM — I65.29 OCCLUSION OF CAROTID ARTERY: Status: ACTIVE | Noted: 2019-01-18

## 2019-02-22 LAB
ALBUMIN SERPL-MCNC: 3.9 G/DL (ref 3.4–5)
ALBUMIN/GLOB SERPL: 1.2 {RATIO} (ref 1–2)
ALP LIVER SERPL-CCNC: 67 U/L (ref 45–117)
ALT SERPL-CCNC: 51 U/L (ref 16–61)
ANION GAP SERPL CALC-SCNC: 4 MMOL/L (ref 0–18)
AST SERPL-CCNC: 24 U/L (ref 15–37)
BILIRUB SERPL-MCNC: 0.5 MG/DL (ref 0.1–2)
BUN BLD-MCNC: 23 MG/DL (ref 7–18)
BUN/CREAT SERPL: 19 (ref 10–20)
CALCIUM BLD-MCNC: 9.1 MG/DL (ref 8.5–10.1)
CHLORIDE SERPL-SCNC: 104 MMOL/L (ref 98–107)
CO2 SERPL-SCNC: 31 MMOL/L (ref 21–32)
CREAT BLD-MCNC: 1.21 MG/DL (ref 0.7–1.3)
EST. AVERAGE GLUCOSE BLD GHB EST-MCNC: 166 MG/DL (ref 68–126)
GLOBULIN PLAS-MCNC: 3.2 G/DL (ref 2.8–4.4)
GLUCOSE BLD-MCNC: 159 MG/DL (ref 70–99)
HBA1C MFR BLD HPLC: 7.4 % (ref ?–5.7)
M PROTEIN MFR SERPL ELPH: 7.1 G/DL (ref 6.4–8.2)
OSMOLALITY SERPL CALC.SUM OF ELEC: 295 MOSM/KG (ref 275–295)
POTASSIUM SERPL-SCNC: 4.7 MMOL/L (ref 3.5–5.1)
SODIUM SERPL-SCNC: 139 MMOL/L (ref 136–145)

## 2019-02-22 PROCEDURE — 99214 OFFICE O/P EST MOD 30 MIN: CPT | Performed by: FAMILY MEDICINE

## 2019-02-22 PROCEDURE — 36415 COLL VENOUS BLD VENIPUNCTURE: CPT | Performed by: FAMILY MEDICINE

## 2019-02-22 PROCEDURE — 80053 COMPREHEN METABOLIC PANEL: CPT | Performed by: FAMILY MEDICINE

## 2019-02-22 PROCEDURE — 83036 HEMOGLOBIN GLYCOSYLATED A1C: CPT | Performed by: FAMILY MEDICINE

## 2019-02-22 RX ORDER — ASPIRIN 325 MG
325 TABLET ORAL DAILY
COMMUNITY

## 2019-02-22 NOTE — PROGRESS NOTES
[facility] [place]  PROGRESS NOTE  Chief Complaint:   Patient presents with:  Diabetes  Follow - Up      HPI:   This is a 82year old male coming in for follow-up on his diabetes. He has been doing very well with his current treatment regimen. BUN/CREA Ratio 15.2 10.0 - 20.0    Calcium, Total 8.9 8.3 - 10.3 mg/dL    Calculated Osmolality 291 275 - 295 mOsm/kg    GFR, Non- 54 (L) >=60    GFR, -American 62 >=60   MAGNESIUM   Result Value Ref Range    Magnesium 2.0 1.8 - 2 • CAD (coronary artery disease) 2007    Had stents.   Seen by [facility] cardiology   • Diabetes [facility])    • Hyperlipidemia    • Hypertension    • Renal insufficiency      Past Surgical History:   Procedure Laterality Date   • CATARACTS, OPHTHM ([facility])     • C 50 UNITS SUBCUTANEOUSLY EVERY NIGHT AT BEDTIME Disp: 45 mL Rfl: 1   Glucose Blood (ACCU-CHEK COMPACT PLUS) In Vitro Strip 3x daily glucose testing. Dx: E11.9.   Insulin Dependent Disp: 300 strip Rfl: 1   ACCU-CHEK SOFTCLIX LANCETS Does not apply Misc 3x da nodes or history of splenectomy. PSYCHIATRIC:  Denies depression or anxiety. ENDOCRINOLOGIC: He denies any low blood sugar reactions. ALLERGIES:  Denies allergic response, history of asthma, sneezing, hives, eczema or rhinitis.      EXAM:   /62 (BP kidney disease, with long-term current use of insulin ([facility] [address]  He has type 2 diabetes with insulin. He is doing very well at regulating his blood sugar with this combination.   Plan: Check hemoglobin A1c and CMP now and continue the same medications.  - COMP 09/01/2018    Patient/Caregiver Education: Patient/Caregiver Education: There are no barriers to learning. Medical education done. Outcome: Patient verbalizes understanding.  Patient is notified to call with any questions, complications, allergies, or wor

## 2019-02-22 NOTE — PATIENT INSTRUCTIONS
Please stop taking: Zetia, TriCor, and niacin. Continue the other medications as ordered. Labs today.

## 2019-02-25 ENCOUNTER — TELEPHONE (OUTPATIENT)
Dept: FAMILY MEDICINE CLINIC | Facility: CLINIC | Age: 84
End: 2019-02-25

## 2019-02-25 NOTE — TELEPHONE ENCOUNTER
Future appt:     Your appointments     Date & Time Appointment Department [facility])    Aug 21, 2019  8:15 AM CDT Laboratory Visit with REF Farhan Bob [facility] ([facility])    Aug 23, 2019  2:00 PM CDT MA Supervisit with Chung Martell,

## 2019-02-25 NOTE — TELEPHONE ENCOUNTER
----- Message from Estefanía Krause MD sent at 2/25/2019  1:07 PM CST -----  Please call Magen Pavon. His hemoglobin A1c is 7.4. His chemistry profile is normal.  No changes recommended now.

## 2019-02-26 ENCOUNTER — OFFICE VISIT (OUTPATIENT)
Dept: FAMILY MEDICINE CLINIC | Facility: CLINIC | Age: 84
End: 2019-02-26
Payer: COMMERCIAL

## 2019-02-26 VITALS
BODY MASS INDEX: 36 KG/M2 | WEIGHT: 240 LBS | DIASTOLIC BLOOD PRESSURE: 62 MMHG | SYSTOLIC BLOOD PRESSURE: 138 MMHG | HEART RATE: 68 BPM | TEMPERATURE: 97 F | OXYGEN SATURATION: 98 %

## 2019-02-26 DIAGNOSIS — L03.312 CELLULITIS OF BACK EXCEPT BUTTOCK: Primary | ICD-10-CM

## 2019-02-26 PROCEDURE — 87205 SMEAR GRAM STAIN: CPT | Performed by: NURSE PRACTITIONER

## 2019-02-26 PROCEDURE — 99214 OFFICE O/P EST MOD 30 MIN: CPT | Performed by: NURSE PRACTITIONER

## 2019-02-26 PROCEDURE — 87070 CULTURE OTHR SPECIMN AEROBIC: CPT | Performed by: NURSE PRACTITIONER

## 2019-02-26 RX ORDER — DOXYCYCLINE HYCLATE 100 MG/1
100 CAPSULE ORAL 2 TIMES DAILY
Qty: 20 CAPSULE | Refills: 0 | Status: SHIPPED | OUTPATIENT
Start: 2019-02-26 | End: 2019-03-08

## 2019-02-26 NOTE — PROGRESS NOTES
HPI:    Patient ID: Luisa Bonilla is a 82year old male. HPI    Rash to back that is getting worse and moving that started over the weekend. Itching some. Burns a little bit. Now has a blister.    Saw Dr. Mohsen Wheeler Friday and stopped several cholestero Glucose Blood (ACCU-CHEK COMPACT PLUS) In Vitro Strip 3x daily glucose testing. Dx: E11.9. Insulin Dependent Disp: 300 strip Rfl: 1   ACCU-CHEK SOFTCLIX LANCETS Does not apply Misc 3x daily glucose testing.    NPI [id number]    DX E11.9     On insulin Dis Cellulitis of back except buttock  (primary encounter diagnosis)    Orders Placed This Encounter      Aerobic Bacterial Culture [E]      Meds This Visit:  Requested Prescriptions     Signed Prescriptions Disp Refills   • Doxycycline Hyclate 100 MG Oral Cap

## 2019-02-26 NOTE — PATIENT INSTRUCTIONS
Culture pending  Directed to take Doxycyline until gone. Recommend to eat with the antibiotic. Keep \"blistered\" area covered. Return to clinic if not better in 48-72 hours. Otherwise follow-up as needed.

## 2019-02-28 ENCOUNTER — TELEPHONE (OUTPATIENT)
Dept: FAMILY MEDICINE CLINIC | Facility: CLINIC | Age: 84
End: 2019-02-28

## 2019-02-28 DIAGNOSIS — N18.30 CHRONIC KIDNEY DISEASE, STAGE III (MODERATE) (HCC): ICD-10-CM

## 2019-02-28 DIAGNOSIS — R42 DIZZINESS: ICD-10-CM

## 2019-02-28 DIAGNOSIS — E55.9 VITAMIN D DEFICIENCY, UNSPECIFIED: ICD-10-CM

## 2019-02-28 DIAGNOSIS — R60.9 EDEMA: ICD-10-CM

## 2019-02-28 DIAGNOSIS — N18.30 HYPERTENSIVE KIDNEY DISEASE WITH CHRONIC KIDNEY DISEASE STAGE III (HCC): Primary | ICD-10-CM

## 2019-02-28 DIAGNOSIS — I12.9 HYPERTENSIVE KIDNEY DISEASE WITH CHRONIC KIDNEY DISEASE STAGE III (HCC): Primary | ICD-10-CM

## 2019-02-28 DIAGNOSIS — E66.3 PATIENT OVERWEIGHT: ICD-10-CM

## 2019-02-28 DIAGNOSIS — E20.8 OTHER HYPOPARATHYROIDISM (HCC): ICD-10-CM

## 2019-02-28 DIAGNOSIS — I10 ESSENTIAL HYPERTENSION: ICD-10-CM

## 2019-02-28 NOTE — TELEPHONE ENCOUNTER
----- Message from [employer] sent at 2/28/2019  3:59 PM CST -----  Culture is showing no growth. Please see how patient is doing.

## 2019-02-28 NOTE — TELEPHONE ENCOUNTER
Patient informed of the below results. States the area might be a little better but it is not worse. States he has only been taking the medication for about 2.5 days now.   Patient will await final results and c/b if symptoms worsen before results are ivana

## 2019-04-24 RX ORDER — LEVOTHYROXINE SODIUM 0.05 MG/1
50 TABLET ORAL
Qty: 90 TABLET | Refills: 1 | Status: SHIPPED | OUTPATIENT
Start: 2019-04-24 | End: 2019-08-23

## 2019-04-24 NOTE — TELEPHONE ENCOUNTER
Future appt:     Your appointments     Date & Time Appointment Department [facility])    May 16, 2019  8:00 AM CDT Laboratory Visit with REF Windy Case Reference Lab ([facility] Ref [facility])        Aug 21, 2019  8:15 AM CDT Laboratory Visit with Sami Khan

## 2019-05-16 ENCOUNTER — APPOINTMENT (OUTPATIENT)
Dept: LAB | Age: 84
End: 2019-05-16
Attending: FAMILY MEDICINE
Payer: MEDICARE

## 2019-05-16 DIAGNOSIS — R60.9 EDEMA: ICD-10-CM

## 2019-05-16 DIAGNOSIS — N18.30 CHRONIC KIDNEY DISEASE, STAGE III (MODERATE) (HCC): ICD-10-CM

## 2019-05-16 DIAGNOSIS — E66.3 PATIENT OVERWEIGHT: ICD-10-CM

## 2019-05-16 DIAGNOSIS — I12.9 HYPERTENSIVE KIDNEY DISEASE WITH CHRONIC KIDNEY DISEASE STAGE III (HCC): ICD-10-CM

## 2019-05-16 DIAGNOSIS — R42 DIZZINESS: ICD-10-CM

## 2019-05-16 DIAGNOSIS — I10 ESSENTIAL HYPERTENSION: ICD-10-CM

## 2019-05-16 DIAGNOSIS — E20.8 OTHER HYPOPARATHYROIDISM (HCC): ICD-10-CM

## 2019-05-16 DIAGNOSIS — N18.30 HYPERTENSIVE KIDNEY DISEASE WITH CHRONIC KIDNEY DISEASE STAGE III (HCC): ICD-10-CM

## 2019-05-16 DIAGNOSIS — E55.9 VITAMIN D DEFICIENCY, UNSPECIFIED: ICD-10-CM

## 2019-05-16 PROCEDURE — 36415 COLL VENOUS BLD VENIPUNCTURE: CPT

## 2019-05-16 PROCEDURE — 84550 ASSAY OF BLOOD/URIC ACID: CPT

## 2019-05-16 PROCEDURE — 83735 ASSAY OF MAGNESIUM: CPT

## 2019-05-16 PROCEDURE — 81003 URINALYSIS AUTO W/O SCOPE: CPT

## 2019-05-16 PROCEDURE — 84100 ASSAY OF PHOSPHORUS: CPT

## 2019-05-16 PROCEDURE — 83970 ASSAY OF PARATHORMONE: CPT

## 2019-05-16 PROCEDURE — 80048 BASIC METABOLIC PNL TOTAL CA: CPT

## 2019-05-16 PROCEDURE — 85027 COMPLETE CBC AUTOMATED: CPT

## 2019-05-16 PROCEDURE — 82040 ASSAY OF SERUM ALBUMIN: CPT

## 2019-05-20 ENCOUNTER — PATIENT MESSAGE (OUTPATIENT)
Dept: FAMILY MEDICINE CLINIC | Facility: CLINIC | Age: 84
End: 2019-05-20

## 2019-05-20 NOTE — TELEPHONE ENCOUNTER
From: Romana Gull  To: Leslie Davis MD  Sent: 5/20/2019 10:45 AM CDT  Subject: Test Results Question    My lab requisition from Dr. Cassandra Morales included a basic metabolic panel.  The results you sent me did not include the results of that test, i

## 2019-05-22 ENCOUNTER — TELEPHONE (OUTPATIENT)
Dept: FAMILY MEDICINE CLINIC | Facility: CLINIC | Age: 84
End: 2019-05-22

## 2019-05-22 NOTE — TELEPHONE ENCOUNTER
being audited for script written 2/25 for Humolog pens   /    needs to know  how many snacks / meals     per day  to total 105 units       please advise

## 2019-05-22 NOTE — TELEPHONE ENCOUNTER
Patient needing new Humalog Rx for 90 day supply. [place] states 90 day Rx would be 36 pens.   Penny Buckley, 05/22/19, 11:41 AM

## 2019-05-29 ENCOUNTER — PATIENT MESSAGE (OUTPATIENT)
Dept: FAMILY MEDICINE CLINIC | Facility: CLINIC | Age: 84
End: 2019-05-29

## 2019-05-29 NOTE — TELEPHONE ENCOUNTER
From: Sara Sandoval  To: Aide Hung MD  Sent: 5/29/2019 9:34 AM CDT  Subject: Non-Urgent Medical Question    I recently had a visit with Dr. Onofre Cabrales. My gfr reading was 74, up from 56 in the past several tests.  Dr. Onofre Cabrales believes that your taking

## 2019-05-31 NOTE — TELEPHONE ENCOUNTER
Received a fax for refill on Lanus solostar.     Future Appointments   Date Time Provider Myla Gamez   8/21/2019  8:15 AM REF [place] [facility]   8/23/2019  2:00 PM Elizabeth Mclaughlin MD [facility] [place] [facility]

## 2019-07-08 RX ORDER — PRAVASTATIN SODIUM 80 MG/1
TABLET ORAL
Qty: 90 TABLET | Refills: 1 | Status: SHIPPED | OUTPATIENT
Start: 2019-07-08 | End: 2019-12-27

## 2019-07-08 NOTE — TELEPHONE ENCOUNTER
Future appt:     Your appointments     Date & Time Appointment Department [facility])    Aug 21, 2019  8:15 AM CDT Laboratory Visit with REF Carmela Sorto Reference [facility] ([facility])        Aug 23, 2019  2:00 PM CDT MA Supervisit with Eduin Clemente

## 2019-07-19 RX ORDER — QUINAPRIL 10 MG/1
10 TABLET ORAL
Qty: 90 TABLET | Refills: 0 | Status: SHIPPED | OUTPATIENT
Start: 2019-07-19 | End: 2019-10-13

## 2019-07-19 NOTE — TELEPHONE ENCOUNTER
Future appt:     Your appointments     Date & Time Appointment Department [facility])    Aug 21, 2019  8:15 AM CDT Laboratory Visit with REF Noa Barajas Reference Lab (ED[facility] Ref Lab [facility])        Aug 23, 2019  2:00 PM CDT MA Supervisit with Jones Keenan

## 2019-08-16 ENCOUNTER — TELEPHONE (OUTPATIENT)
Dept: FAMILY MEDICINE CLINIC | Facility: CLINIC | Age: 84
End: 2019-08-16

## 2019-08-16 DIAGNOSIS — I25.9 CHRONIC ISCHEMIC HEART DISEASE: ICD-10-CM

## 2019-08-16 DIAGNOSIS — Z79.4 TYPE 2 DIABETES MELLITUS WITH STAGE 3 CHRONIC KIDNEY DISEASE, WITH LONG-TERM CURRENT USE OF INSULIN (HCC): Primary | ICD-10-CM

## 2019-08-16 DIAGNOSIS — N18.30 TYPE 2 DIABETES MELLITUS WITH STAGE 3 CHRONIC KIDNEY DISEASE, WITH LONG-TERM CURRENT USE OF INSULIN (HCC): Primary | ICD-10-CM

## 2019-08-16 DIAGNOSIS — I10 ESSENTIAL HYPERTENSION, BENIGN: ICD-10-CM

## 2019-08-16 DIAGNOSIS — E11.22 TYPE 2 DIABETES MELLITUS WITH STAGE 3 CHRONIC KIDNEY DISEASE, WITH LONG-TERM CURRENT USE OF INSULIN (HCC): Primary | ICD-10-CM

## 2019-08-16 DIAGNOSIS — Z12.5 SCREENING FOR MALIGNANT NEOPLASM OF PROSTATE: ICD-10-CM

## 2019-08-16 DIAGNOSIS — E78.01 FAMILIAL HYPERCHOLESTEROLEMIA: ICD-10-CM

## 2019-08-16 NOTE — TELEPHONE ENCOUNTER
Patient informed due to [facility],  Should have labs done at [facility]/agreed. Orders faxed.   Huber Duke, 08/16/19, 11:31 AM

## 2019-08-17 ENCOUNTER — MA CHART PREP (OUTPATIENT)
Dept: FAMILY MEDICINE CLINIC | Facility: CLINIC | Age: 84
End: 2019-08-17

## 2019-08-23 ENCOUNTER — OFFICE VISIT (OUTPATIENT)
Dept: FAMILY MEDICINE CLINIC | Facility: CLINIC | Age: 84
End: 2019-08-23
Payer: COMMERCIAL

## 2019-08-23 VITALS
WEIGHT: 243 LBS | HEART RATE: 77 BPM | HEIGHT: 68 IN | RESPIRATION RATE: 16 BRPM | DIASTOLIC BLOOD PRESSURE: 58 MMHG | BODY MASS INDEX: 36.83 KG/M2 | TEMPERATURE: 99 F | OXYGEN SATURATION: 98 % | SYSTOLIC BLOOD PRESSURE: 138 MMHG

## 2019-08-23 DIAGNOSIS — Z00.00 ENCOUNTER FOR ANNUAL HEALTH EXAMINATION: Primary | ICD-10-CM

## 2019-08-23 DIAGNOSIS — N40.1 BENIGN PROSTATIC HYPERPLASIA WITH URINARY FREQUENCY: ICD-10-CM

## 2019-08-23 DIAGNOSIS — I65.23 BILATERAL CAROTID ARTERY OCCLUSION: ICD-10-CM

## 2019-08-23 DIAGNOSIS — E11.22 TYPE 2 DIABETES MELLITUS WITH STAGE 3 CHRONIC KIDNEY DISEASE, WITH LONG-TERM CURRENT USE OF INSULIN (HCC): ICD-10-CM

## 2019-08-23 DIAGNOSIS — I25.10 ATHEROSCLEROSIS OF NATIVE CORONARY ARTERY OF NATIVE HEART WITHOUT ANGINA PECTORIS: ICD-10-CM

## 2019-08-23 DIAGNOSIS — N18.30 CHRONIC KIDNEY DISEASE, STAGE III (MODERATE) (HCC): ICD-10-CM

## 2019-08-23 DIAGNOSIS — R35.0 BENIGN PROSTATIC HYPERPLASIA WITH URINARY FREQUENCY: ICD-10-CM

## 2019-08-23 DIAGNOSIS — N18.30 TYPE 2 DIABETES MELLITUS WITH STAGE 3 CHRONIC KIDNEY DISEASE, WITH LONG-TERM CURRENT USE OF INSULIN (HCC): ICD-10-CM

## 2019-08-23 DIAGNOSIS — I10 BENIGN ESSENTIAL HYPERTENSION: ICD-10-CM

## 2019-08-23 DIAGNOSIS — E20.0 IDIOPATHIC HYPOPARATHYROIDISM (HCC): ICD-10-CM

## 2019-08-23 DIAGNOSIS — Z79.4 TYPE 2 DIABETES MELLITUS WITH STAGE 3 CHRONIC KIDNEY DISEASE, WITH LONG-TERM CURRENT USE OF INSULIN (HCC): ICD-10-CM

## 2019-08-23 DIAGNOSIS — E78.01 FAMILIAL HYPERCHOLESTEROLEMIA: ICD-10-CM

## 2019-08-23 DIAGNOSIS — E03.9 ACQUIRED HYPOTHYROIDISM: ICD-10-CM

## 2019-08-23 DIAGNOSIS — E66.01 CLASS 2 SEVERE OBESITY DUE TO EXCESS CALORIES WITH SERIOUS COMORBIDITY AND BODY MASS INDEX (BMI) OF 36.0 TO 36.9 IN ADULT (HCC): ICD-10-CM

## 2019-08-23 DIAGNOSIS — E55.9 VITAMIN D DEFICIENCY: ICD-10-CM

## 2019-08-23 PROBLEM — R42 DIZZINESS: Status: ACTIVE | Noted: 2019-08-23

## 2019-08-23 PROBLEM — R04.0 EPISTAXIS: Status: ACTIVE | Noted: 2019-07-11

## 2019-08-23 PROBLEM — E20.9 HYPOPARATHYROIDISM (HCC): Status: ACTIVE | Noted: 2019-02-22

## 2019-08-23 PROBLEM — N17.9 ACUTE KIDNEY FAILURE, UNSPECIFIED (HCC): Status: ACTIVE | Noted: 2019-08-23

## 2019-08-23 PROCEDURE — 96160 PT-FOCUSED HLTH RISK ASSMT: CPT | Performed by: FAMILY MEDICINE

## 2019-08-23 PROCEDURE — 99397 PER PM REEVAL EST PAT 65+ YR: CPT | Performed by: FAMILY MEDICINE

## 2019-08-23 PROCEDURE — G0439 PPPS, SUBSEQ VISIT: HCPCS | Performed by: FAMILY MEDICINE

## 2019-08-23 RX ORDER — LEVOTHYROXINE SODIUM 0.07 MG/1
75 TABLET ORAL
Qty: 90 TABLET | Refills: 1 | Status: SHIPPED | OUTPATIENT
Start: 2019-08-23 | End: 2020-02-12

## 2019-08-23 NOTE — PATIENT INSTRUCTIONS
Recommended Websites for Advanced Directives    [employer]. org/publications/Documents/personal_dec. pdf  An information packet, including necessary form from the [address] website. http://www. [place]state. [place]/public/books/adv

## 2019-08-24 LAB
ABSOLUTE BASOPHILS: 60 CELLS/UL (ref 0–200)
ABSOLUTE EOSINOPHILS: 180 CELLS/UL (ref 15–500)
ABSOLUTE LYMPHOCYTES: 1615 CELLS/UL (ref 850–3900)
ABSOLUTE MONOCYTES: 535 CELLS/UL (ref 200–950)
ABSOLUTE NEUTROPHILS: 2610 CELLS/UL (ref 1500–7800)
ALBUMIN/GLOBULIN RATIO: 1.7 (CALC) (ref 1–2.5)
ALBUMIN: 4 G/DL (ref 3.6–5.1)
ALKALINE PHOSPHATASE: 55 U/L (ref 40–115)
ALT: 41 U/L (ref 9–46)
AST: 22 U/L (ref 10–35)
BASOPHILS: 1.2 %
BILIRUBIN, TOTAL: 0.7 MG/DL (ref 0.2–1.2)
BUN: 21 MG/DL (ref 7–25)
CALCIUM: 9.3 MG/DL (ref 8.6–10.3)
CARBON DIOXIDE: 22 MMOL/L (ref 20–32)
CHLORIDE: 105 MMOL/L (ref 98–110)
CHOL/HDLC RATIO: 4.3 (CALC)
CHOLESTEROL, TOTAL: 150 MG/DL
CREATININE, RANDOM URINE: 94 MG/DL (ref 20–320)
CREATININE: 1.03 MG/DL (ref 0.7–1.11)
EGFR IF AFRICN AM: 78 ML/MIN/1.73M2
EGFR IF NONAFRICN AM: 67 ML/MIN/1.73M2
EOSINOPHILS: 3.6 %
GLOBULIN: 2.4 G/DL (CALC) (ref 1.9–3.7)
GLUCOSE: 151 MG/DL (ref 65–99)
HDL CHOLESTEROL: 35 MG/DL
HEMATOCRIT: 48.3 % (ref 38.5–50)
HEMOGLOBIN A1C: 7.5 % OF TOTAL HGB
HEMOGLOBIN: 16.3 G/DL (ref 13.2–17.1)
LDL-CHOLESTEROL: 84 MG/DL (CALC)
LYMPHOCYTES: 32.3 %
MCH: 28.8 PG (ref 27–33)
MCHC: 33.7 G/DL (ref 32–36)
MCV: 85.5 FL (ref 80–100)
MICROALBUMIN/CREATININE RATIO, RANDOM URINE: 4 MCG/MG CREAT
MICROALBUMIN: 0.4 MG/DL
MONOCYTES: 10.7 %
MPV: 11.8 FL (ref 7.5–12.5)
NEUTROPHILS: 52.2 %
NON-HDL CHOLESTEROL: 115 MG/DL (CALC)
PLATELET COUNT: 198 THOUSAND/UL (ref 140–400)
POTASSIUM: 4.6 MMOL/L (ref 3.5–5.3)
PROTEIN, TOTAL: 6.4 G/DL (ref 6.1–8.1)
PSA, TOTAL: 0.3 NG/ML
RDW: 14 % (ref 11–15)
RED BLOOD CELL COUNT: 5.65 MILLION/UL (ref 4.2–5.8)
SODIUM: 142 MMOL/L (ref 135–146)
TRIGLYCERIDES: 214 MG/DL
TSH: 6.11 MIU/L (ref 0.4–4.5)
VITAMIN D, 25-OH, TOTAL: 38 NG/ML (ref 30–100)
WHITE BLOOD CELL COUNT: 5 THOUSAND/UL (ref 3.8–10.8)

## 2019-08-24 NOTE — PROGRESS NOTES
[facility] [place]  PROGRESS NOTE  Chief Complaint:   Patient presents with:  Physical      HPI:   This is a 82year old male coming in for his annual Medicare wellness visit. He just had 4 facial skin lesions removed at Dr. Lokesh Dugan office. mmol/L    POTASSIUM 4.6 3.5 - 5.3 mmol/L    CHLORIDE 105 98 - 110 mmol/L    CARBON DIOXIDE 22 20 - 32 mmol/L    CALCIUM 9.3 8.6 - 10.3 mg/dL    PROTEIN, TOTAL 6.4 6.1 - 8.1 g/dL    ALBUMIN 4.0 3.6 - 5.1 g/dL    GLOBULIN 2.4 1.9 - 3.7 g/dL (calc)    ALBUMIN Had stents.   Seen by [facility] cardiology   • Diabetes [facility])    • Hyperlipidemia    • Hypertension    • Renal insufficiency      Past Surgical History:   Procedure Laterality Date   • CATARACTS, OPHTHM ([facility])     • COLONOSCOPY  2008   • OTHER      BCC wit MG Oral Tablet 24 Hr Take 1 tablet (25 mg total) by mouth 2 (two) times daily. Disp: 180 tablet Rfl: 1   Meclizine HCl 12.5 MG Oral Tab Take 1 tablet (12.5 mg total) by mouth 3 (three) times daily as needed.  Disp: 30 tablet Rfl: 0   Glucose Blood (ACCU-YESY bowel or bladder control. HEMATOLOGIC:  Denies anemia, bleeding or bruising. LYMPHATICS:  Denies enlarged nodes or history of splenectomy. PSYCHIATRIC:  Denies depression or anxiety.   ENDOCRINOLOGIC: He has had some blood sugars down into the 70s but no up-to-date on immunizations. He is up-to-date on colonoscopy. 2. Type 2 diabetes mellitus with stage 3 chronic kidney disease, with long-term current use of insulin ([facility] [address]  He has type 2 diabetes.   His kidney disease is actually improved on his recent b have bilateral carotid artery occlusion. 12.  Hypothyroidism  His TSH level has gone up. Plan: Increase levothyroxine to 75 mcg daily. We will recheck his TSH level with his next blood draw in February 2020.     Meds & Refills for this Visit:  Requeste Hypoparathyroidism ([facility]     Epistaxis     Dizziness     Acute kidney failure, unspecified ([facility]      Gabriella England MD  8/23/2019  7:02 PM

## 2019-08-26 ENCOUNTER — TELEPHONE (OUTPATIENT)
Dept: FAMILY MEDICINE CLINIC | Facility: CLINIC | Age: 84
End: 2019-08-26

## 2019-08-26 NOTE — TELEPHONE ENCOUNTER
----- Message from Sania Jara MD sent at 8/26/2019  8:11 AM CDT -----  Several labs were added to the ones that we had drawn on the 21st.  The PSA level was normal at 0.3 which is wonderful. Vitamin D level is normal at 38. This is very good news.

## 2019-08-26 NOTE — TELEPHONE ENCOUNTER
Wife Andrea Johnson informed of below. Confidential form has been signed. Expressed understanding.   Argelia Dawkins, 08/26/19, 10:41 AM

## 2019-08-28 RX ORDER — LANCETS
EACH MISCELLANEOUS
Qty: 300 EACH | Refills: 1 | Status: SHIPPED | OUTPATIENT
Start: 2019-08-28 | End: 2019-08-30

## 2019-08-28 RX ORDER — BLOOD SUGAR DIAGNOSTIC, DRUM
STRIP MISCELLANEOUS
Qty: 300 STRIP | Refills: 1 | Status: SHIPPED | OUTPATIENT
Start: 2019-08-28 | End: 2019-08-30

## 2019-08-29 ENCOUNTER — TELEPHONE (OUTPATIENT)
Dept: FAMILY MEDICINE CLINIC | Facility: CLINIC | Age: 84
End: 2019-08-29

## 2019-08-29 NOTE — TELEPHONE ENCOUNTER
Per Pharmacist Nancy Mccarthy, she is requesting a RF of patient's Humalog for #135ml instead of the #45 received on 8/23/2019. OTW for tomorrow. Please advise.

## 2019-08-30 RX ORDER — BLOOD SUGAR DIAGNOSTIC, DRUM
STRIP MISCELLANEOUS
Qty: 900 STRIP | Refills: 1 | Status: SHIPPED | OUTPATIENT
Start: 2019-08-30 | End: 2020-02-19 | Stop reason: ALTCHOICE

## 2019-08-30 RX ORDER — LANCETS
EACH MISCELLANEOUS
Qty: 900 EACH | Refills: 1 | Status: SHIPPED | OUTPATIENT
Start: 2019-08-30 | End: 2020-02-19

## 2019-09-03 RX ORDER — METOPROLOL SUCCINATE 25 MG/1
25 TABLET, EXTENDED RELEASE ORAL 2 TIMES DAILY
Qty: 180 TABLET | Refills: 1 | Status: SHIPPED | OUTPATIENT
Start: 2019-09-03 | End: 2020-02-26

## 2019-09-03 NOTE — TELEPHONE ENCOUNTER
Future appt:    Last Appointment:  8/23/2019  CHOLESTEROL, TOTAL (mg/dL)   Date Value   08/21/2019 150     HDL CHOLESTEROL (mg/dL)   Date Value   08/21/2019 35 (L)     LDL-CHOLESTEROL (mg/dL (calc))   Date Value   08/21/2019 84     TRIGLYCERIDES (mg/dL)

## 2019-09-24 ENCOUNTER — TELEPHONE (OUTPATIENT)
Dept: FAMILY MEDICINE CLINIC | Facility: CLINIC | Age: 84
End: 2019-09-24

## 2019-09-24 NOTE — TELEPHONE ENCOUNTER
Patient received letter for overdue labs in error. Outstanding labs are for future draw.   Penny Buckley, 09/24/19, 10:40 AM

## 2019-10-14 RX ORDER — LEVOTHYROXINE SODIUM 0.05 MG/1
TABLET ORAL
Qty: 90 TABLET | Refills: 1 | OUTPATIENT
Start: 2019-10-14

## 2019-10-14 RX ORDER — QUINAPRIL 10 MG/1
10 TABLET ORAL
Qty: 90 TABLET | Refills: 1 | Status: SHIPPED | OUTPATIENT
Start: 2019-10-14 | End: 2020-04-08

## 2019-10-14 NOTE — TELEPHONE ENCOUNTER
Future appt:    Last Appointment:  08/  CHOLESTEROL, TOTAL (mg/dL)   Date Value   08/21/2019 150     HDL CHOLESTEROL (mg/dL)   Date Value   08/21/2019 35 (L)     LDL-CHOLESTEROL (mg/dL (calc))   Date Value   08/21/2019 84     TRIGLYCERIDES (mg/dL)   Date V

## 2019-10-14 NOTE — TELEPHONE ENCOUNTER
Levothyroxine dose is now 75 mcg daily. Please call the pharmacy and see if he needs a refill of the 75 mcg dose.

## 2019-12-27 RX ORDER — PRAVASTATIN SODIUM 80 MG/1
TABLET ORAL
Qty: 90 TABLET | Refills: 1 | Status: SHIPPED | OUTPATIENT
Start: 2019-12-27 | End: 2020-07-06

## 2019-12-27 NOTE — TELEPHONE ENCOUNTER
Future appt:     Last Appointment with provider:   8/23/2019; Return in 6 months (on 2/23/2020).      Last appointment at [facility]:  8/23/2019  CHOLESTEROL, TOTAL (mg/dL)   Date Value   08/21/2019 150     HDL CHOLESTEROL (mg/dL)   Date Value   08/21/201

## 2020-01-16 ENCOUNTER — TELEPHONE (OUTPATIENT)
Dept: FAMILY MEDICINE CLINIC | Facility: CLINIC | Age: 85
End: 2020-01-16

## 2020-01-16 DIAGNOSIS — E11.22 TYPE 2 DIABETES MELLITUS WITH STAGE 3 CHRONIC KIDNEY DISEASE, WITH LONG-TERM CURRENT USE OF INSULIN (HCC): Primary | ICD-10-CM

## 2020-01-16 DIAGNOSIS — N18.30 TYPE 2 DIABETES MELLITUS WITH STAGE 3 CHRONIC KIDNEY DISEASE, WITH LONG-TERM CURRENT USE OF INSULIN (HCC): Primary | ICD-10-CM

## 2020-01-16 DIAGNOSIS — Z79.4 TYPE 2 DIABETES MELLITUS WITH STAGE 3 CHRONIC KIDNEY DISEASE, WITH LONG-TERM CURRENT USE OF INSULIN (HCC): Primary | ICD-10-CM

## 2020-01-16 DIAGNOSIS — E03.9 ACQUIRED HYPOTHYROIDISM: ICD-10-CM

## 2020-02-08 NOTE — TELEPHONE ENCOUNTER
Future appt:     Your appointments     Date & Time Appointment Department [facility])    Feb 24, 2020  2:00 PM CST MA Supervisit with Fanny Medina MD 25 [facility], Stacie Cabezas ([facility] [place]

## 2020-02-11 ENCOUNTER — PATIENT MESSAGE (OUTPATIENT)
Dept: FAMILY MEDICINE CLINIC | Facility: CLINIC | Age: 85
End: 2020-02-11

## 2020-02-11 DIAGNOSIS — Z79.4 TYPE 2 DIABETES MELLITUS WITH STAGE 3 CHRONIC KIDNEY DISEASE, WITH LONG-TERM CURRENT USE OF INSULIN (HCC): Primary | ICD-10-CM

## 2020-02-11 DIAGNOSIS — N18.30 TYPE 2 DIABETES MELLITUS WITH STAGE 3 CHRONIC KIDNEY DISEASE, WITH LONG-TERM CURRENT USE OF INSULIN (HCC): Primary | ICD-10-CM

## 2020-02-11 DIAGNOSIS — E11.22 TYPE 2 DIABETES MELLITUS WITH STAGE 3 CHRONIC KIDNEY DISEASE, WITH LONG-TERM CURRENT USE OF INSULIN (HCC): Primary | ICD-10-CM

## 2020-02-11 DIAGNOSIS — I25.10 ATHEROSCLEROSIS OF NATIVE CORONARY ARTERY OF NATIVE HEART WITHOUT ANGINA PECTORIS: ICD-10-CM

## 2020-02-11 NOTE — TELEPHONE ENCOUNTER
From: Pam Saenz  To: David Morton MD  Sent: 2/11/2020 1:00 PM CST  Subject: Non-Urgent Medical Question    I have been having blood sugar lows recently, especially at cardiac rehab.  One of the nurses suggested I schedule a session with Diabetes

## 2020-02-12 RX ORDER — LEVOTHYROXINE SODIUM 0.07 MG/1
75 TABLET ORAL
Qty: 90 TABLET | Refills: 1 | Status: SHIPPED | OUTPATIENT
Start: 2020-02-12 | End: 2020-08-17

## 2020-02-12 NOTE — TELEPHONE ENCOUNTER
Future appt:     Your appointments     Date & Time Appointment Department [facility])    Feb 24, 2020  2:00 PM OFELIA QUARLES Supervisit with Efe Castillo MD [address] [place] [address]

## 2020-02-18 LAB
ALBUMIN/GLOBULIN RATIO: 1.4 (CALC) (ref 1–2.5)
ALBUMIN: 3.7 G/DL (ref 3.6–5.1)
ALKALINE PHOSPHATASE: 57 U/L (ref 35–144)
ALT: 46 U/L (ref 9–46)
AST: 22 U/L (ref 10–35)
BILIRUBIN, TOTAL: 0.6 MG/DL (ref 0.2–1.2)
BUN: 20 MG/DL (ref 7–25)
CALCIUM: 9.4 MG/DL (ref 8.6–10.3)
CARBON DIOXIDE: 31 MMOL/L (ref 20–32)
CHLORIDE: 101 MMOL/L (ref 98–110)
CREATININE: 0.96 MG/DL (ref 0.7–1.11)
EGFR IF AFRICN AM: 85 ML/MIN/1.73M2
EGFR IF NONAFRICN AM: 73 ML/MIN/1.73M2
GLOBULIN: 2.6 G/DL (CALC) (ref 1.9–3.7)
GLUCOSE: 160 MG/DL (ref 65–99)
HEMOGLOBIN A1C: 6.9 % OF TOTAL HGB
POTASSIUM: 4.4 MMOL/L (ref 3.5–5.3)
PROTEIN, TOTAL: 6.3 G/DL (ref 6.1–8.1)
SODIUM: 139 MMOL/L (ref 135–146)
TSH: 3.11 MIU/L (ref 0.4–4.5)

## 2020-02-19 ENCOUNTER — TELEPHONE (OUTPATIENT)
Dept: FAMILY MEDICINE CLINIC | Facility: CLINIC | Age: 85
End: 2020-02-19

## 2020-02-19 RX ORDER — INSULIN LISPRO 100 [IU]/ML
INJECTION, SOLUTION INTRAVENOUS; SUBCUTANEOUS
Qty: 135 ML | Refills: 1 | Status: SHIPPED | OUTPATIENT
Start: 2020-02-19 | End: 2020-09-24

## 2020-02-19 RX ORDER — LANCETS
EACH MISCELLANEOUS
Qty: 900 EACH | Refills: 1 | Status: SHIPPED | OUTPATIENT
Start: 2020-02-19 | End: 2020-12-01

## 2020-02-19 NOTE — TELEPHONE ENCOUNTER
Future appt:     Your appointments     Date & Time Appointment Department [facility])    Feb 24, 2020  2:00 PM CST MA Supervisit with Aide Hung MD [address] [place] [address], Jessa Lazar [facility]

## 2020-02-24 ENCOUNTER — OFFICE VISIT (OUTPATIENT)
Dept: FAMILY MEDICINE CLINIC | Facility: CLINIC | Age: 85
End: 2020-02-24
Payer: COMMERCIAL

## 2020-02-24 VITALS
HEIGHT: 68 IN | BODY MASS INDEX: 37.25 KG/M2 | HEART RATE: 64 BPM | SYSTOLIC BLOOD PRESSURE: 152 MMHG | TEMPERATURE: 98 F | OXYGEN SATURATION: 97 % | DIASTOLIC BLOOD PRESSURE: 80 MMHG | WEIGHT: 245.81 LBS | RESPIRATION RATE: 18 BRPM

## 2020-02-24 DIAGNOSIS — E11.22 TYPE 2 DIABETES MELLITUS WITH STAGE 3 CHRONIC KIDNEY DISEASE, WITH LONG-TERM CURRENT USE OF INSULIN (HCC): ICD-10-CM

## 2020-02-24 DIAGNOSIS — L57.0 ACTINIC KERATOSIS: ICD-10-CM

## 2020-02-24 DIAGNOSIS — I25.9 CHRONIC ISCHEMIC HEART DISEASE: ICD-10-CM

## 2020-02-24 DIAGNOSIS — I25.10 ATHEROSCLEROSIS OF NATIVE CORONARY ARTERY OF NATIVE HEART WITHOUT ANGINA PECTORIS: ICD-10-CM

## 2020-02-24 DIAGNOSIS — E20.0 IDIOPATHIC HYPOPARATHYROIDISM (HCC): ICD-10-CM

## 2020-02-24 DIAGNOSIS — Z00.00 ENCOUNTER FOR ANNUAL HEALTH EXAMINATION: Primary | ICD-10-CM

## 2020-02-24 DIAGNOSIS — N40.1 BENIGN PROSTATIC HYPERPLASIA WITH URINARY FREQUENCY: ICD-10-CM

## 2020-02-24 DIAGNOSIS — E66.01 CLASS 2 SEVERE OBESITY DUE TO EXCESS CALORIES WITH SERIOUS COMORBIDITY AND BODY MASS INDEX (BMI) OF 36.0 TO 36.9 IN ADULT (HCC): ICD-10-CM

## 2020-02-24 DIAGNOSIS — N18.30 CHRONIC KIDNEY DISEASE, STAGE III (MODERATE) (HCC): ICD-10-CM

## 2020-02-24 DIAGNOSIS — E78.01 FAMILIAL HYPERCHOLESTEROLEMIA: ICD-10-CM

## 2020-02-24 DIAGNOSIS — Z79.4 TYPE 2 DIABETES MELLITUS WITH STAGE 3 CHRONIC KIDNEY DISEASE, WITH LONG-TERM CURRENT USE OF INSULIN (HCC): ICD-10-CM

## 2020-02-24 DIAGNOSIS — E55.9 VITAMIN D DEFICIENCY: ICD-10-CM

## 2020-02-24 DIAGNOSIS — R35.0 BENIGN PROSTATIC HYPERPLASIA WITH URINARY FREQUENCY: ICD-10-CM

## 2020-02-24 DIAGNOSIS — E03.9 ACQUIRED HYPOTHYROIDISM: ICD-10-CM

## 2020-02-24 DIAGNOSIS — I10 BENIGN ESSENTIAL HYPERTENSION: ICD-10-CM

## 2020-02-24 DIAGNOSIS — N18.30 TYPE 2 DIABETES MELLITUS WITH STAGE 3 CHRONIC KIDNEY DISEASE, WITH LONG-TERM CURRENT USE OF INSULIN (HCC): ICD-10-CM

## 2020-02-24 DIAGNOSIS — R42 DIZZINESS: ICD-10-CM

## 2020-02-24 PROBLEM — N17.9 ACUTE KIDNEY FAILURE, UNSPECIFIED (HCC): Status: RESOLVED | Noted: 2019-08-23 | Resolved: 2020-02-24

## 2020-02-24 PROCEDURE — 99397 PER PM REEVAL EST PAT 65+ YR: CPT | Performed by: FAMILY MEDICINE

## 2020-02-24 PROCEDURE — 96160 PT-FOCUSED HLTH RISK ASSMT: CPT | Performed by: FAMILY MEDICINE

## 2020-02-24 PROCEDURE — G0439 PPPS, SUBSEQ VISIT: HCPCS | Performed by: FAMILY MEDICINE

## 2020-02-24 RX ORDER — ALCLOMETASONE DIPROPIONATE 0.5 MG/G
OINTMENT TOPICAL
COMMUNITY
Start: 2020-02-19 | End: 2021-02-26

## 2020-02-24 RX ORDER — FLUOROURACIL 50 MG/G
CREAM TOPICAL
COMMUNITY
Start: 2020-02-18 | End: 2021-02-26

## 2020-02-24 NOTE — PROGRESS NOTES
[address]  PROGRESS NOTE  Chief Complaint:   Patient presents with: Well Adult: Medicare      HPI:   This is a 82year old male coming in for his annual wellness visit. He is doing cardiac rehab usually 2 days a week.   He said that hi PHOSPHATASE 57 35 - 144 U/L    AST 22 10 - 35 U/L    ALT 46 9 - 46 U/L   HEMOGLOBIN A1C   Result Value Ref Range    HEMOGLOBIN A1c 6.9 (H) <5.7 % of total Hgb   ASSAY, THYROID STIM HORMONE   Result Value Ref Range    TSH 3.11 0.40 - 4.50 mIU/L       Past M Insulin Dependent 900 each 1   • Blood Glucose Monitoring Suppl (ACCU-CHEK DENNY) Does not apply Device 1 kit by Does not apply route daily. 3x daily glucose testing. Dx: E11.22.   Insulin Dependent 1 Device 0   • Glucose Blood (ACCU-CHEK DENNY) In Vitro S but has not been successful. EENT:  Eyes:  Denies eye pain, visual loss, blurred vision, double vision or yellow sclerae. Ears, Nose, Throat:  Denies hearing loss, sneezing, congestion, runny nose or sore throat.   INTEGUMENTARY:  Denies rashes, itching, s discharge Ears: External normal. Nose: patent, no nasal discharge Throat:  No tonsillar erythema or exudate. Mouth:  No oral lesions or ulcerations, good dentition. NECK: Supple, no CLAD, no JVD, no thyromegaly.   SKIN: No rashes, no skin lesion, no bruis calories with serious comorbidity and body mass index (BMI) of 36.0 to 36.9 in adult [facility])  He does have class II obesity. He has gained a small amount of weight.  - CBC WITH DIFFERENTIAL WITH PLATELET; Future  - COMP METABOLIC PANEL (14);  Future  - HEMOG Future  - PSA, DIAGNOSTIC; Future  - CBC WITH DIFFERENTIAL WITH PLATELET  - COMP METABOLIC PANEL (14)  - HEMOGLOBIN A1C  - LIPID PANEL  - MICROALB/CREAT RATIO, RANDOM URINE  - ASSAY, THYROID STIM HORMONE  - URIC ACID, SERUM  - VITAMIN D, 1,25 DIHYDROXY  - ASSAY, THYROID STIM HORMONE; Future  - URIC ACID, SERUM; Future  - VITAMIN D, 1,25 DIHYDROXY; Future  - PSA, DIAGNOSTIC; Future  - CBC WITH DIFFERENTIAL WITH PLATELET  - COMP METABOLIC PANEL (14)  - HEMOGLOBIN A1C  - LIPID PANEL  - MICROALB/CREAT RATIO, RA enlargement of prostate     Chronic kidney disease, stage III (moderate) (HCC)     Benign neoplasm of colon     Chronic ischemic heart disease     Type 2 diabetes mellitus with stage 3 chronic kidney disease, with long-term current use of insulin ([facility]

## 2020-02-24 NOTE — PATIENT INSTRUCTIONS
Recommended Websites for Advanced Directives    [email or web address].no. org/publications/Documents/personal_dec. pdf  An information packet, including necessary form from the [address] website. http://www. [place].state. [place]/public/books/adv

## 2020-02-26 ENCOUNTER — TELEPHONE (OUTPATIENT)
Dept: FAMILY MEDICINE CLINIC | Facility: CLINIC | Age: 85
End: 2020-02-26

## 2020-02-26 RX ORDER — METOPROLOL SUCCINATE 25 MG/1
TABLET, EXTENDED RELEASE ORAL
Qty: 180 TABLET | Refills: 1 | Status: SHIPPED | OUTPATIENT
Start: 2020-02-26 | End: 2020-07-31

## 2020-02-26 NOTE — TELEPHONE ENCOUNTER
Future appt:     Your appointments     Date & Time Appointment Department [facility])    Aug 24, 2020  2:00 PM CDT Follow Up Visit with Graciela Ballard MD 25 [facility], Manav Escobar ([facility]

## 2020-04-08 RX ORDER — QUINAPRIL 10 MG/1
TABLET ORAL
Qty: 90 TABLET | Refills: 0 | Status: SHIPPED | OUTPATIENT
Start: 2020-04-08 | End: 2020-08-17 | Stop reason: DRUGHIGH

## 2020-04-08 NOTE — TELEPHONE ENCOUNTER
Future appt:     Your appointments     Date & Time Appointment Department [facility])    Aug 24, 2020  2:00 PM CDT Follow Up Visit with Paty Barrios MD [address], Gin Leonardo ([facility])            [address] [place] [address]

## 2020-05-04 ENCOUNTER — PATIENT MESSAGE (OUTPATIENT)
Dept: FAMILY MEDICINE CLINIC | Facility: CLINIC | Age: 85
End: 2020-05-04

## 2020-05-04 NOTE — TELEPHONE ENCOUNTER
Thank you for the update. I would recommend continuing to take the tamsulosin for now. Only take the pain medication when needed. Please continue to try to contact Dr. Konstantin Herrmann office. Continue to drink large amounts of fluids.

## 2020-05-04 NOTE — TELEPHONE ENCOUNTER
From: Harriet Benton  To: Ky Keys MD  Sent: 5/4/2020 9:59 AM CDT  Subject: Non-Urgent Medical Question    Saturday night I went to the emergency room at [facility] with a kidney stone.  I was treated (catscan) and released, along with in

## 2020-05-27 ENCOUNTER — TELEPHONE (OUTPATIENT)
Dept: FAMILY MEDICINE CLINIC | Facility: CLINIC | Age: 85
End: 2020-05-27

## 2020-05-27 DIAGNOSIS — N18.30 TYPE 2 DIABETES MELLITUS WITH STAGE 3 CHRONIC KIDNEY DISEASE, WITH LONG-TERM CURRENT USE OF INSULIN (HCC): Primary | ICD-10-CM

## 2020-05-27 DIAGNOSIS — Z79.4 TYPE 2 DIABETES MELLITUS WITH STAGE 3 CHRONIC KIDNEY DISEASE, WITH LONG-TERM CURRENT USE OF INSULIN (HCC): Primary | ICD-10-CM

## 2020-05-27 DIAGNOSIS — E11.22 TYPE 2 DIABETES MELLITUS WITH STAGE 3 CHRONIC KIDNEY DISEASE, WITH LONG-TERM CURRENT USE OF INSULIN (HCC): Primary | ICD-10-CM

## 2020-05-27 NOTE — TELEPHONE ENCOUNTER
Future appt:     Your appointments     Date & Time Appointment Department [facility])    Aug 24, 2020  2:00 PM CDT Follow Up Visit with Girma Domínguez MD [address], Ovid Councilman ([facility])            [address] [place] [address]

## 2020-05-27 NOTE — TELEPHONE ENCOUNTER
Confirmed with Constanza Chen at [place] patient's pen needle usage per day.   Penny Buckley, 05/27/20, 2:16 PM

## 2020-06-17 ENCOUNTER — PATIENT MESSAGE (OUTPATIENT)
Dept: FAMILY MEDICINE CLINIC | Facility: CLINIC | Age: 85
End: 2020-06-17

## 2020-06-17 NOTE — TELEPHONE ENCOUNTER
From: Erik Gordillo  To: Tung Blanco MD  Sent: 6/17/2020 8:44 AM CDT  Subject: Non-Urgent Medical Question    About a month ago, you indicated that you are not able to access my records from [address] [place] [address].  At the time, I was nervous about c

## 2020-06-17 NOTE — TELEPHONE ENCOUNTER
From: Haley Ashby  To: TENZIN Gomez  Sent: 6/17/2020 9:45 AM CDT  Subject: Non-Urgent Medical Question    Lindsay:    Does this mean that he won't be able to access my records until my next visit in August?    Sorry to keep asking questions.

## 2020-07-06 RX ORDER — PRAVASTATIN SODIUM 80 MG/1
TABLET ORAL
Qty: 90 TABLET | Refills: 1 | Status: SHIPPED | OUTPATIENT
Start: 2020-07-06 | End: 2020-12-30

## 2020-07-06 NOTE — TELEPHONE ENCOUNTER
Future appt:     Your appointments     Date & Time Appointment Department [facility])    Aug 24, 2020  2:00 PM CDT Follow Up Visit with Girma Domínguez MD 25 [facility] ([facility])            [address] [place] [address]

## 2020-07-28 ENCOUNTER — PATIENT MESSAGE (OUTPATIENT)
Dept: FAMILY MEDICINE CLINIC | Facility: CLINIC | Age: 85
End: 2020-07-28

## 2020-07-28 NOTE — TELEPHONE ENCOUNTER
From: Haley Ashby  To: Noemi Thompson MD  Sent: 7/28/2020 4:00 PM CDT  Subject: Non-Urgent Medical Question    Dr. My Arana:    I recently sent my (self-administered) blood pressure results to Dr. Mark Paniagua.  There are for the past 8 days and are listed,

## 2020-07-30 ENCOUNTER — PATIENT MESSAGE (OUTPATIENT)
Dept: FAMILY MEDICINE CLINIC | Facility: CLINIC | Age: 85
End: 2020-07-30

## 2020-07-30 NOTE — TELEPHONE ENCOUNTER
From: Dawna Richey  To: Xu Alfrod MD  Sent: 7/30/2020 8:43 AM CDT  Subject: Non-Urgent Medical Question    Dr. Rodriges Medico upped my metropolol to 50 mg twice daily and called in the prescription.  He asked me to check blood pressure daily 2-3 hours aft

## 2020-07-31 RX ORDER — METOPROLOL TARTRATE 50 MG/1
50 TABLET, FILM COATED ORAL 2 TIMES DAILY
Refills: 0 | COMMUNITY
Start: 2020-07-31 | End: 2020-08-24

## 2020-08-06 ENCOUNTER — PATIENT MESSAGE (OUTPATIENT)
Dept: FAMILY MEDICINE CLINIC | Facility: CLINIC | Age: 85
End: 2020-08-06

## 2020-08-06 NOTE — TELEPHONE ENCOUNTER
From: Chen Howard  To: Melissa Perez MD  Sent: 8/6/2020 9:09 AM CDT  Subject: Non-Urgent Medical Question    Dr. Hector Granados:    I have a physical exam scheduled for August 24. Would you have the blood lab request sent to [address] [place] [address] in [place]?     Dr. Aundrea Singleton

## 2020-08-13 ENCOUNTER — PATIENT MESSAGE (OUTPATIENT)
Dept: FAMILY MEDICINE CLINIC | Facility: CLINIC | Age: 85
End: 2020-08-13

## 2020-08-14 NOTE — TELEPHONE ENCOUNTER
From: Harriet Benton  To: Ky Keys MD  Sent: 8/13/2020 11:29 AM CDT  Subject: Non-Urgent Medical Question    Dr. Ashleigh Wilson:    Here are my blood pressure readings for the past eight days I sent to Dr. Olya Christensen, beginning with August 6: 139-70; 158-70

## 2020-08-17 ENCOUNTER — PATIENT MESSAGE (OUTPATIENT)
Dept: FAMILY MEDICINE CLINIC | Facility: CLINIC | Age: 85
End: 2020-08-17

## 2020-08-17 RX ORDER — LEVOTHYROXINE SODIUM 0.07 MG/1
TABLET ORAL
Qty: 90 TABLET | Refills: 1 | Status: SHIPPED | OUTPATIENT
Start: 2020-08-17 | End: 2020-08-17

## 2020-08-17 RX ORDER — QUINAPRIL 10 MG/1
40 TABLET ORAL DAILY
Qty: 90 TABLET | Refills: 0 | COMMUNITY
Start: 2020-08-17 | End: 2020-08-24

## 2020-08-17 RX ORDER — LEVOTHYROXINE SODIUM 0.07 MG/1
75 TABLET ORAL
Qty: 90 TABLET | Refills: 1 | Status: SHIPPED | OUTPATIENT
Start: 2020-08-17 | End: 2021-08-06

## 2020-08-17 RX ORDER — METOPROLOL SUCCINATE 25 MG/1
TABLET, EXTENDED RELEASE ORAL
Qty: 180 TABLET | Refills: 0 | OUTPATIENT
Start: 2020-08-17

## 2020-08-17 NOTE — TELEPHONE ENCOUNTER
Future appt:     Your appointments     Date & Time Appointment Department [facility])    Aug 24, 2020  2:00 PM CDT Follow Up Visit with Pato Banda MD [address], [facility]

## 2020-08-17 NOTE — TELEPHONE ENCOUNTER
Future appt:     Your appointments     Date & Time Appointment Department [facility])    Aug 24, 2020  2:00 PM CDT Follow Up Visit with Fanny Medina MD [address] [place] [address], [facility])            [address] [place] [address]

## 2020-08-17 NOTE — TELEPHONE ENCOUNTER
From: Harriet Benton  To: Ky Keys MD  Sent: 8/17/2020 11:46 AM CDT  Subject: Non-Urgent Medical Question    Dr. Ashleigh Wilson:    In response to my message with revised blood pressure readings, Dr. Velasquez Simple office changed my quinapril from 20 to 40 mg

## 2020-08-19 LAB
ALBUMIN/GLOBULIN RATIO: 1.8 (CALC) (ref 1–2.5)
ALBUMIN: 3.9 G/DL (ref 3.6–5.1)
ALKALINE PHOSPHATASE: 55 U/L (ref 35–144)
ALT: 44 U/L (ref 9–46)
AST: 23 U/L (ref 10–35)
BILIRUBIN, TOTAL: 0.7 MG/DL (ref 0.2–1.2)
BUN: 18 MG/DL (ref 7–25)
CALCIUM: 9.2 MG/DL (ref 8.6–10.3)
CARBON DIOXIDE: 31 MMOL/L (ref 20–32)
CHLORIDE: 103 MMOL/L (ref 98–110)
CREATININE: 1.09 MG/DL (ref 0.7–1.11)
EGFR IF AFRICN AM: 72 ML/MIN/1.73M2
EGFR IF NONAFRICN AM: 62 ML/MIN/1.73M2
GLOBULIN: 2.2 G/DL (CALC) (ref 1.9–3.7)
GLUCOSE: 137 MG/DL (ref 65–99)
HEMOGLOBIN A1C: 6.4 % OF TOTAL HGB
POTASSIUM: 4.5 MMOL/L (ref 3.5–5.3)
PROTEIN, TOTAL: 6.1 G/DL (ref 6.1–8.1)
SODIUM: 140 MMOL/L (ref 135–146)
TSH: 4.11 MIU/L (ref 0.4–4.5)

## 2020-08-24 ENCOUNTER — OFFICE VISIT (OUTPATIENT)
Dept: FAMILY MEDICINE CLINIC | Facility: CLINIC | Age: 85
End: 2020-08-24
Payer: COMMERCIAL

## 2020-08-24 VITALS
HEART RATE: 59 BPM | SYSTOLIC BLOOD PRESSURE: 134 MMHG | TEMPERATURE: 97 F | WEIGHT: 240 LBS | DIASTOLIC BLOOD PRESSURE: 58 MMHG | HEIGHT: 68 IN | OXYGEN SATURATION: 98 % | BODY MASS INDEX: 36.37 KG/M2 | RESPIRATION RATE: 18 BRPM

## 2020-08-24 DIAGNOSIS — E78.2 MIXED HYPERLIPIDEMIA: ICD-10-CM

## 2020-08-24 DIAGNOSIS — I10 BENIGN ESSENTIAL HYPERTENSION: Primary | ICD-10-CM

## 2020-08-24 DIAGNOSIS — E55.9 VITAMIN D DEFICIENCY: ICD-10-CM

## 2020-08-24 DIAGNOSIS — N18.30 TYPE 2 DIABETES MELLITUS WITH STAGE 3 CHRONIC KIDNEY DISEASE, WITH LONG-TERM CURRENT USE OF INSULIN (HCC): ICD-10-CM

## 2020-08-24 DIAGNOSIS — E03.9 ACQUIRED HYPOTHYROIDISM: ICD-10-CM

## 2020-08-24 DIAGNOSIS — I25.10 ATHEROSCLEROSIS OF NATIVE CORONARY ARTERY OF NATIVE HEART WITHOUT ANGINA PECTORIS: ICD-10-CM

## 2020-08-24 DIAGNOSIS — E11.22 TYPE 2 DIABETES MELLITUS WITH STAGE 3 CHRONIC KIDNEY DISEASE, WITH LONG-TERM CURRENT USE OF INSULIN (HCC): ICD-10-CM

## 2020-08-24 DIAGNOSIS — Z79.4 TYPE 2 DIABETES MELLITUS WITH STAGE 3 CHRONIC KIDNEY DISEASE, WITH LONG-TERM CURRENT USE OF INSULIN (HCC): ICD-10-CM

## 2020-08-24 DIAGNOSIS — E66.01 CLASS 2 SEVERE OBESITY DUE TO EXCESS CALORIES WITH SERIOUS COMORBIDITY AND BODY MASS INDEX (BMI) OF 36.0 TO 36.9 IN ADULT (HCC): ICD-10-CM

## 2020-08-24 PROCEDURE — 3075F SYST BP GE 130 - 139MM HG: CPT | Performed by: FAMILY MEDICINE

## 2020-08-24 PROCEDURE — 3078F DIAST BP <80 MM HG: CPT | Performed by: FAMILY MEDICINE

## 2020-08-24 PROCEDURE — 3008F BODY MASS INDEX DOCD: CPT | Performed by: FAMILY MEDICINE

## 2020-08-24 PROCEDURE — 99214 OFFICE O/P EST MOD 30 MIN: CPT | Performed by: FAMILY MEDICINE

## 2020-08-24 RX ORDER — QUINAPRIL 20 MG/1
40 TABLET ORAL NIGHTLY
COMMUNITY
Start: 2020-08-17

## 2020-08-24 RX ORDER — METOPROLOL SUCCINATE 50 MG/1
50 TABLET, EXTENDED RELEASE ORAL 2 TIMES DAILY
COMMUNITY
Start: 2020-07-29

## 2020-08-24 NOTE — PROGRESS NOTES
[address]  PROGRESS NOTE  Chief Complaint:   Patient presents with: Follow - Up      HPI:   This is a 82year old male coming in for follow-up on his diabetes.   He said that he is injecting between 30 and 50 units of insulin before eac • Renal insufficiency      Past Surgical History:   Procedure Laterality Date   • CATARACTS, OPHTHM ([facility])     • COLONOSCOPY  2008   • OTHER      BCC with grafting face   • TONSILLECTOMY       Social History:  Social History    Tobacco Use      Smoking st Dependent 900 each 1   • Blood Glucose Monitoring Suppl (ACCU-CHEK DENNY) Does not apply Device 1 kit by Does not apply route daily. 3x daily glucose testing. Dx: E11.22.   Insulin Dependent 1 Device 0   • Glucose Blood (ACCU-CHEK DENNY) In Vitro Strip 3x numbness or tingling in the extremities,change in bowel or bladder control. HEMATOLOGIC:  Denies anemia, bleeding or bruising. LYMPHATICS:  Denies enlarged nodes or history of splenectomy. PSYCHIATRIC:  Denies depression or anxiety.   ENDOCRINOLOGIC:  Puneet Barnard pressure medication. No changes recommended now. 2. Type 2 diabetes mellitus with stage 3 chronic kidney disease, with long-term current use of insulin ([facility]  He does have type 2 diabetes with chronic kidney disease.   His most recent A1c is 6.4.    3. (BMI) of 36.0 to 36.9 in adult [facility])     Unsteady     Postsurgical percutaneous transluminal coronary angioplasty status     Occlusion of carotid artery     Kidney disorder     Coronary atherosclerosis of native coronary artery     Hypoparathyroidism ([facility]

## 2020-08-29 ENCOUNTER — PATIENT MESSAGE (OUTPATIENT)
Dept: FAMILY MEDICINE CLINIC | Facility: CLINIC | Age: 85
End: 2020-08-29

## 2020-08-29 NOTE — TELEPHONE ENCOUNTER
From: Romana Gull  To: Andressa Carlson MD  Sent: 8/29/2020 9:58 AM CDT  Subject: Non-Urgent Medical Question    Had cystoscopy with Dr. Ranjit Corral. No cancer mentioned. Concern is enlarged prostate.  Dr. Ranjit Corral says if untreated someday will not be able to pee

## 2020-08-31 NOTE — TELEPHONE ENCOUNTER
Agree with Dr. Neema Berry recommendations. It is okay to stop taking aspirin during the time of the surgery. You should stop the aspirin for 1 week prior to surgery and stay off it for 2 to 3 days after the surgery.

## 2020-09-02 NOTE — TELEPHONE ENCOUNTER
Future appt:     Your appointments     Date & Time Appointment Department [facility])    Feb 26, 2021  2:00 PM CST MyChart Annual Health Assessment with Luh Salinas MD [address] [place] [address] ([facility]

## 2020-09-24 DIAGNOSIS — E11.22 TYPE 2 DIABETES MELLITUS WITH STAGE 3 CHRONIC KIDNEY DISEASE, WITH LONG-TERM CURRENT USE OF INSULIN (HCC): Primary | ICD-10-CM

## 2020-09-24 DIAGNOSIS — Z79.4 TYPE 2 DIABETES MELLITUS WITH STAGE 3 CHRONIC KIDNEY DISEASE, WITH LONG-TERM CURRENT USE OF INSULIN (HCC): Primary | ICD-10-CM

## 2020-09-24 DIAGNOSIS — N18.30 TYPE 2 DIABETES MELLITUS WITH STAGE 3 CHRONIC KIDNEY DISEASE, WITH LONG-TERM CURRENT USE OF INSULIN (HCC): Primary | ICD-10-CM

## 2020-09-24 RX ORDER — INSULIN LISPRO 100 [IU]/ML
INJECTION, SOLUTION INTRAVENOUS; SUBCUTANEOUS
Qty: 135 ML | Refills: 1 | Status: SHIPPED | OUTPATIENT
Start: 2020-09-24 | End: 2021-04-13

## 2020-09-24 NOTE — TELEPHONE ENCOUNTER
Future appt:     Your appointments     Date & Time Appointment Department [facility])    Feb 26, 2021  2:00 PM CST MyChart Annual Health Assessment with Radha [place] MD 25 [facility], Brielle Ojeda ([facility])

## 2020-10-22 ENCOUNTER — TELEPHONE (OUTPATIENT)
Dept: FAMILY MEDICINE CLINIC | Facility: CLINIC | Age: 85
End: 2020-10-22

## 2020-10-22 NOTE — TELEPHONE ENCOUNTER
patient had pre op appt on 11/3 with Dr. Lesa Berumen that was scheduled. has surgery on 11/17.  no sure where to reschedule patient for his pre op

## 2020-10-22 NOTE — TELEPHONE ENCOUNTER
Future Appointments   Date Time Provider Myla Gamez   11/3/2020 11:00 AM Keri Casarez MD [facility] [place] [facility]   2/26/2021  2:00 PM Rolanda Rosado MD [facility] [place] [facility]

## 2020-10-23 NOTE — TELEPHONE ENCOUNTER
Appt R/S. Surgery not until 11/17/20. New Appt given. Nanette Jackson, 10/23/20, 9:24 AM    Future appt:     Your appointments     Date & Time Appointment Department [facility])    Nov 12, 2020  2:20 PM CST Presurgical Visit with Anna Marie Bauer MD [facility]

## 2020-11-09 ENCOUNTER — APPOINTMENT (OUTPATIENT)
Dept: LAB | Age: 85
End: 2020-11-09
Attending: FAMILY MEDICINE
Payer: MEDICARE

## 2020-11-09 ENCOUNTER — OFFICE VISIT (OUTPATIENT)
Dept: FAMILY MEDICINE CLINIC | Facility: CLINIC | Age: 85
End: 2020-11-09
Payer: COMMERCIAL

## 2020-11-09 VITALS
SYSTOLIC BLOOD PRESSURE: 149 MMHG | OXYGEN SATURATION: 99 % | HEIGHT: 68 IN | TEMPERATURE: 99 F | HEART RATE: 64 BPM | WEIGHT: 238.19 LBS | RESPIRATION RATE: 20 BRPM | BODY MASS INDEX: 36.1 KG/M2 | DIASTOLIC BLOOD PRESSURE: 80 MMHG

## 2020-11-09 DIAGNOSIS — N18.31 TYPE 2 DIABETES MELLITUS WITH STAGE 3A CHRONIC KIDNEY DISEASE, WITH LONG-TERM CURRENT USE OF INSULIN (HCC): ICD-10-CM

## 2020-11-09 DIAGNOSIS — M54.50 ACUTE BILATERAL LOW BACK PAIN WITHOUT SCIATICA: Primary | ICD-10-CM

## 2020-11-09 DIAGNOSIS — R42 DIZZINESS: ICD-10-CM

## 2020-11-09 DIAGNOSIS — Z79.4 TYPE 2 DIABETES MELLITUS WITH STAGE 3A CHRONIC KIDNEY DISEASE, WITH LONG-TERM CURRENT USE OF INSULIN (HCC): ICD-10-CM

## 2020-11-09 DIAGNOSIS — R35.0 BENIGN PROSTATIC HYPERPLASIA WITH URINARY FREQUENCY: ICD-10-CM

## 2020-11-09 DIAGNOSIS — I10 BENIGN ESSENTIAL HYPERTENSION: ICD-10-CM

## 2020-11-09 DIAGNOSIS — N40.1 BENIGN PROSTATIC HYPERPLASIA WITH URINARY FREQUENCY: ICD-10-CM

## 2020-11-09 DIAGNOSIS — E11.22 TYPE 2 DIABETES MELLITUS WITH STAGE 3A CHRONIC KIDNEY DISEASE, WITH LONG-TERM CURRENT USE OF INSULIN (HCC): ICD-10-CM

## 2020-11-09 PROCEDURE — 36415 COLL VENOUS BLD VENIPUNCTURE: CPT | Performed by: FAMILY MEDICINE

## 2020-11-09 PROCEDURE — 80061 LIPID PANEL: CPT | Performed by: FAMILY MEDICINE

## 2020-11-09 PROCEDURE — 84153 ASSAY OF PSA TOTAL: CPT | Performed by: FAMILY MEDICINE

## 2020-11-09 PROCEDURE — 83036 HEMOGLOBIN GLYCOSYLATED A1C: CPT | Performed by: FAMILY MEDICINE

## 2020-11-09 PROCEDURE — 80053 COMPREHEN METABOLIC PANEL: CPT | Performed by: FAMILY MEDICINE

## 2020-11-09 PROCEDURE — 84550 ASSAY OF BLOOD/URIC ACID: CPT | Performed by: FAMILY MEDICINE

## 2020-11-09 PROCEDURE — 85025 COMPLETE CBC W/AUTO DIFF WBC: CPT | Performed by: FAMILY MEDICINE

## 2020-11-09 PROCEDURE — 82570 ASSAY OF URINE CREATININE: CPT | Performed by: FAMILY MEDICINE

## 2020-11-09 PROCEDURE — 3079F DIAST BP 80-89 MM HG: CPT | Performed by: FAMILY MEDICINE

## 2020-11-09 PROCEDURE — 3077F SYST BP >= 140 MM HG: CPT | Performed by: FAMILY MEDICINE

## 2020-11-09 PROCEDURE — 3008F BODY MASS INDEX DOCD: CPT | Performed by: FAMILY MEDICINE

## 2020-11-09 PROCEDURE — 84443 ASSAY THYROID STIM HORMONE: CPT | Performed by: FAMILY MEDICINE

## 2020-11-09 PROCEDURE — 82652 VIT D 1 25-DIHYDROXY: CPT | Performed by: FAMILY MEDICINE

## 2020-11-09 PROCEDURE — 99214 OFFICE O/P EST MOD 30 MIN: CPT | Performed by: FAMILY MEDICINE

## 2020-11-09 PROCEDURE — 82043 UR ALBUMIN QUANTITATIVE: CPT | Performed by: FAMILY MEDICINE

## 2020-11-09 NOTE — PROGRESS NOTES
[facility] [place]  PROGRESS NOTE  Chief Complaint:   Patient presents with:  Back Pain: no injury  Procedure:  follow up      HPI:   This is a 82year old male coming in for low back pain.   He said that the pain is been there for several weeks  85 > OR = 60 mL/min/1.73m2    BUN/CREATININE RATIO NOT APPLICABLE 6 - 22 (calc)    SODIUM 139 135 - 146 mmol/L    POTASSIUM 4.4 3.5 - 5.3 mmol/L    CHLORIDE 101 98 - 110 mmol/L    CARBON DIOXIDE 31 20 - 32 mmol/L    CALCIUM 9.4 8.6 - 10.3 reaction(s): Unknown  Current Meds:  Current Outpatient Medications   Medication Sig Dispense Refill   • Insulin Lispro, 1 Unit Dial, (HUMALOG KWIKPEN) 100 UNIT/ML Subcutaneous Solution Pen-injector inject 43 units 3 times daily - Maximum 150 units daily-- mouth 3 (three) times daily as needed. (Patient not taking: Reported on 11/9/2020 ) 30 tablet 0      Counseling given: Not Answered       REVIEW OF SYSTEMS:   CONSTITUTIONAL:  See HPI.     EENT:  Eyes:  Denies eye pain, visual loss, blurred vision, double v atraumatic Eyes: EOMI, PERRLA, no scleral icterus, conjunctivae clear bilaterally, no eye discharge Ears: External normal. Nose: patent, no nasal discharge Throat:  No tonsillar erythema or exudate. Mouth:  No oral lesions or ulcerations, good dentition. recommended now. 3. Type 2 diabetes mellitus with stage 3a chronic kidney disease, with long-term current use of insulin ([address]  He said that his blood sugars have been mildly elevated but again he has a great deal of pain now.     4. Benign prostatic hyp pectoris     Hypoparathyroidism ([facility]     Epistaxis     Dizziness     Acquired hypothyroidism     Acute bilateral low back pain without sciatica      Amalia Schilling MD  11/9/2020  3:57 PM

## 2020-11-10 ENCOUNTER — PATIENT MESSAGE (OUTPATIENT)
Dept: FAMILY MEDICINE CLINIC | Facility: CLINIC | Age: 85
End: 2020-11-10

## 2020-11-10 ENCOUNTER — TELEPHONE (OUTPATIENT)
Dept: FAMILY MEDICINE CLINIC | Facility: CLINIC | Age: 85
End: 2020-11-10

## 2020-11-10 NOTE — TELEPHONE ENCOUNTER
From: Azucena Hardin  To: Amy Strauss MD  Sent: 11/10/2020 9:22 AM CST  Subject: Test Results Question    Dr. Zilphia Krabbe:    I have reviewed the test results and read your comments. My major question is on the microlab/creat ratio, random urine.    The

## 2020-11-10 NOTE — TELEPHONE ENCOUNTER
----- Message from Bia Valle MD sent at 11/10/2020  9:05 AM CST -----  Very good news on the blood work. Hemoglobin A1c is 6.5 which says that the diabetes control is very good.   CBC is normal with a normal hemoglobin and a normal white blood coun

## 2020-11-10 NOTE — TELEPHONE ENCOUNTER
Artem Webber,    The change in the microalbumin to creatinine ratio is relatively mild and not significant at this time.

## 2020-11-11 ENCOUNTER — TELEPHONE (OUTPATIENT)
Dept: FAMILY MEDICINE CLINIC | Facility: CLINIC | Age: 85
End: 2020-11-11

## 2020-11-11 DIAGNOSIS — M54.50 ACUTE BILATERAL LOW BACK PAIN WITHOUT SCIATICA: Primary | ICD-10-CM

## 2020-11-11 PROCEDURE — G2012 BRIEF CHECK IN BY MD/QHP: HCPCS | Performed by: FAMILY MEDICINE

## 2020-11-11 NOTE — TELEPHONE ENCOUNTER
Note     I called and spoke with Silvia Reeves. He called and canceled his surgery with urology. He said that his back pain is still present and seems to be a little bit worse rather than better. He is not showing any other signs or symptoms.   He does not have a

## 2020-11-11 NOTE — TELEPHONE ENCOUNTER
I called and spoke with Tejinder Ferris. He called and canceled his surgery with urology. He said that his back pain is still present and seems to be a little bit worse rather than better. He is not showing any other signs or symptoms.   He does not have a fever or

## 2020-12-01 NOTE — TELEPHONE ENCOUNTER
Future appt:     Your appointments     Date & Time Appointment Department [facility])    Feb 26, 2021  2:00 PM CST MyChart Annual Health Assessment with Nguyen Mccartney MD 25 [facility], Lidia Lovelace ([facility])

## 2020-12-02 RX ORDER — LANCETS
EACH MISCELLANEOUS
Qty: 300 EACH | Refills: 3 | Status: SHIPPED | OUTPATIENT
Start: 2020-12-02

## 2020-12-30 RX ORDER — PRAVASTATIN SODIUM 80 MG/1
TABLET ORAL
Qty: 90 TABLET | Refills: 1 | Status: SHIPPED | OUTPATIENT
Start: 2020-12-30 | End: 2021-07-06

## 2020-12-30 NOTE — TELEPHONE ENCOUNTER
Pravastatin: 7/6/20    Future appt:     Your appointments     Date & Time Appointment Department [facility])    Feb 26, 2021  2:00 PM CST MyCjustint Annual Health Assessment with Aide Hung MD 25 [facility], [facility] ([address]

## 2021-01-04 ENCOUNTER — TELEPHONE (OUTPATIENT)
Dept: FAMILY MEDICINE CLINIC | Facility: CLINIC | Age: 86
End: 2021-01-04

## 2021-01-04 NOTE — TELEPHONE ENCOUNTER
Patient asking if there is a waiting list for the  Covid19 Vaccine that he can sign up for. Patient informed there is not a sign up list at this time. Advised to keep up with the [address] [facility] [address] and   His pharmacy/agreed.   Chelsea Waldrop, 01/04/21, 11:15 A

## 2021-01-06 NOTE — TELEPHONE ENCOUNTER
Patient informed today Sharon Albert approved for 77older. Advised to watch the [facility] Website/agreed.

## 2021-01-27 DIAGNOSIS — Z23 NEED FOR VACCINATION: ICD-10-CM

## 2021-02-05 RX ORDER — BLOOD SUGAR DIAGNOSTIC
STRIP MISCELLANEOUS
Qty: 300 STRIP | Refills: 1 | Status: SHIPPED | OUTPATIENT
Start: 2021-02-05

## 2021-02-05 NOTE — TELEPHONE ENCOUNTER
Future appt:     Your appointments     Date & Time Appointment Department [facility])    Feb 26, 2021  2:00 PM CST MA Supervisit with Paty Barrios MD 25 [facility], Gin Leonardo ([place]

## 2021-02-08 ENCOUNTER — PATIENT MESSAGE (OUTPATIENT)
Dept: FAMILY MEDICINE CLINIC | Facility: CLINIC | Age: 86
End: 2021-02-08

## 2021-02-08 DIAGNOSIS — Z79.4 TYPE 2 DIABETES MELLITUS WITH STAGE 3A CHRONIC KIDNEY DISEASE, WITH LONG-TERM CURRENT USE OF INSULIN (HCC): Primary | ICD-10-CM

## 2021-02-08 DIAGNOSIS — N18.31 TYPE 2 DIABETES MELLITUS WITH STAGE 3A CHRONIC KIDNEY DISEASE, WITH LONG-TERM CURRENT USE OF INSULIN (HCC): Primary | ICD-10-CM

## 2021-02-08 DIAGNOSIS — E11.22 TYPE 2 DIABETES MELLITUS WITH STAGE 3A CHRONIC KIDNEY DISEASE, WITH LONG-TERM CURRENT USE OF INSULIN (HCC): Primary | ICD-10-CM

## 2021-02-08 NOTE — TELEPHONE ENCOUNTER
From: Ant Griffiths  To:  Tg Love MD  Sent: 2/8/2021 8:37 AM CST  Subject: Non-Urgent Medical Question    Dr. Samanta Alston:    I have an appointment on Feb. 26. If there are any blood tests involved, could you please send the order to [facility] in [facility]

## 2021-02-09 ENCOUNTER — TELEPHONE (OUTPATIENT)
Dept: FAMILY MEDICINE CLINIC | Facility: CLINIC | Age: 86
End: 2021-02-09

## 2021-02-09 NOTE — TELEPHONE ENCOUNTER
Please removed Covid19 Vaccine Order. 1st Vaccine done at [facility].   Bria Chavez, 02/09/21, 9:37 AM

## 2021-02-23 LAB
ALBUMIN/GLOBULIN RATIO: 1.6 (CALC) (ref 1–2.5)
ALBUMIN: 4.1 G/DL (ref 3.6–5.1)
ALKALINE PHOSPHATASE: 66 U/L (ref 35–144)
ALT: 43 U/L (ref 9–46)
AST: 21 U/L (ref 10–35)
BILIRUBIN, TOTAL: 0.6 MG/DL (ref 0.2–1.2)
BUN: 22 MG/DL (ref 7–25)
CALCIUM: 9.5 MG/DL (ref 8.6–10.3)
CARBON DIOXIDE: 29 MMOL/L (ref 20–32)
CHLORIDE: 104 MMOL/L (ref 98–110)
CREATININE: 1.04 MG/DL (ref 0.7–1.11)
EGFR IF AFRICN AM: 77 ML/MIN/1.73M2
EGFR IF NONAFRICN AM: 66 ML/MIN/1.73M2
GLOBULIN: 2.6 G/DL (CALC) (ref 1.9–3.7)
GLUCOSE: 181 MG/DL (ref 65–99)
HEMOGLOBIN A1C: 6.4 % OF TOTAL HGB
POTASSIUM: 4.7 MMOL/L (ref 3.5–5.3)
PROTEIN, TOTAL: 6.7 G/DL (ref 6.1–8.1)
SODIUM: 141 MMOL/L (ref 135–146)

## 2021-02-26 ENCOUNTER — OFFICE VISIT (OUTPATIENT)
Dept: FAMILY MEDICINE CLINIC | Facility: CLINIC | Age: 86
End: 2021-02-26
Payer: COMMERCIAL

## 2021-02-26 VITALS
HEIGHT: 68 IN | SYSTOLIC BLOOD PRESSURE: 150 MMHG | WEIGHT: 240 LBS | HEART RATE: 68 BPM | BODY MASS INDEX: 36.37 KG/M2 | TEMPERATURE: 99 F | RESPIRATION RATE: 16 BRPM | DIASTOLIC BLOOD PRESSURE: 72 MMHG | OXYGEN SATURATION: 99 %

## 2021-02-26 DIAGNOSIS — E20.0 IDIOPATHIC HYPOPARATHYROIDISM (HCC): ICD-10-CM

## 2021-02-26 DIAGNOSIS — I10 BENIGN ESSENTIAL HYPERTENSION: ICD-10-CM

## 2021-02-26 DIAGNOSIS — R42 DIZZINESS: ICD-10-CM

## 2021-02-26 DIAGNOSIS — D12.6 BENIGN NEOPLASM OF COLON, UNSPECIFIED PART OF COLON: ICD-10-CM

## 2021-02-26 DIAGNOSIS — E66.01 CLASS 2 SEVERE OBESITY DUE TO EXCESS CALORIES WITH SERIOUS COMORBIDITY AND BODY MASS INDEX (BMI) OF 36.0 TO 36.9 IN ADULT (HCC): ICD-10-CM

## 2021-02-26 DIAGNOSIS — N18.31 TYPE 2 DIABETES MELLITUS WITH STAGE 3A CHRONIC KIDNEY DISEASE, WITH LONG-TERM CURRENT USE OF INSULIN (HCC): ICD-10-CM

## 2021-02-26 DIAGNOSIS — E11.22 TYPE 2 DIABETES MELLITUS WITH STAGE 3A CHRONIC KIDNEY DISEASE, WITH LONG-TERM CURRENT USE OF INSULIN (HCC): ICD-10-CM

## 2021-02-26 DIAGNOSIS — Z95.5 PRESENCE OF STENT IN CORONARY ARTERY: ICD-10-CM

## 2021-02-26 DIAGNOSIS — Z98.61 POSTSURGICAL PERCUTANEOUS TRANSLUMINAL CORONARY ANGIOPLASTY STATUS: ICD-10-CM

## 2021-02-26 DIAGNOSIS — N40.1 BENIGN PROSTATIC HYPERPLASIA WITH URINARY FREQUENCY: ICD-10-CM

## 2021-02-26 DIAGNOSIS — I25.9 CHRONIC ISCHEMIC HEART DISEASE: ICD-10-CM

## 2021-02-26 DIAGNOSIS — L57.0 ACTINIC KERATOSIS: ICD-10-CM

## 2021-02-26 DIAGNOSIS — I25.10 ATHEROSCLEROSIS OF NATIVE CORONARY ARTERY OF NATIVE HEART WITHOUT ANGINA PECTORIS: ICD-10-CM

## 2021-02-26 DIAGNOSIS — Z00.00 ENCOUNTER FOR ANNUAL HEALTH EXAMINATION: Primary | ICD-10-CM

## 2021-02-26 DIAGNOSIS — Z79.4 TYPE 2 DIABETES MELLITUS WITH STAGE 3A CHRONIC KIDNEY DISEASE, WITH LONG-TERM CURRENT USE OF INSULIN (HCC): ICD-10-CM

## 2021-02-26 DIAGNOSIS — E03.9 ACQUIRED HYPOTHYROIDISM: ICD-10-CM

## 2021-02-26 DIAGNOSIS — E78.2 MIXED HYPERLIPIDEMIA: ICD-10-CM

## 2021-02-26 DIAGNOSIS — I65.23 BILATERAL CAROTID ARTERY OCCLUSION: ICD-10-CM

## 2021-02-26 DIAGNOSIS — N18.31 STAGE 3A CHRONIC KIDNEY DISEASE (HCC): ICD-10-CM

## 2021-02-26 DIAGNOSIS — R35.0 BENIGN PROSTATIC HYPERPLASIA WITH URINARY FREQUENCY: ICD-10-CM

## 2021-02-26 DIAGNOSIS — E55.9 VITAMIN D DEFICIENCY: ICD-10-CM

## 2021-02-26 PROBLEM — R26.81 UNSTEADY: Status: RESOLVED | Noted: 2019-01-18 | Resolved: 2021-02-26

## 2021-02-26 PROBLEM — M54.50 ACUTE BILATERAL LOW BACK PAIN WITHOUT SCIATICA: Status: RESOLVED | Noted: 2020-11-09 | Resolved: 2021-02-26

## 2021-02-26 PROBLEM — R04.0 EPISTAXIS: Status: RESOLVED | Noted: 2019-07-11 | Resolved: 2021-02-26

## 2021-02-26 PROCEDURE — 3008F BODY MASS INDEX DOCD: CPT | Performed by: FAMILY MEDICINE

## 2021-02-26 PROCEDURE — 96160 PT-FOCUSED HLTH RISK ASSMT: CPT | Performed by: FAMILY MEDICINE

## 2021-02-26 PROCEDURE — G0439 PPPS, SUBSEQ VISIT: HCPCS | Performed by: FAMILY MEDICINE

## 2021-02-26 PROCEDURE — 3077F SYST BP >= 140 MM HG: CPT | Performed by: FAMILY MEDICINE

## 2021-02-26 PROCEDURE — 3078F DIAST BP <80 MM HG: CPT | Performed by: FAMILY MEDICINE

## 2021-02-26 PROCEDURE — 99397 PER PM REEVAL EST PAT 65+ YR: CPT | Performed by: FAMILY MEDICINE

## 2021-02-26 NOTE — PROGRESS NOTES
REASON FOR VISIT:    Rafa Dangelo is a 82year old male who presents for a MA Supervisit. He said that he feels like he is doing okay overall.     He notes that his balance is still not the best.  He uses a walker when he is in his bathroom to help wi DAILY 90 tablet 1   • Accu-Chek Softclix Lancets Does not apply Misc 3x daily glucose testing.   Dx:  E11.22  Insulin Dependent 300 each 3   • Insulin Lispro, 1 Unit Dial, (HUMALOG KWIKPEN) 100 UNIT/ML Subcutaneous Solution Pen-injector inject 43 units 3 ti mg/dL 211(H) 214(H) 139 107   HDL 40 - 59 mg/dL 39(L) 35(L) 33(L) 33(L)   LDL <100 mg/dL 57 84 57 43     BUN and Cr Latest Ref Rng & Units 2/22/2021 11/9/2020 8/18/2020 2/17/2020 8/21/2019 5/16/2019 2/22/2019   BUN 7 - 25 mg/dL 22 26(H) 18 20 21 16 23(H) Yes  Hearing Problems?: No     Functional Status     Hearing Problems?: No  Vision Problems? : No  Difficulty walking?: No  Difficulty dressing or bathing?: No  Problems with daily activities? : No  Memory Problems?: No      Fall/Risk Assessment     Have y Immunizations     Zoster (Not covered by Medicare Part B) No orders found for this or any previous visit.      SPECIFIC DISEASE MONITORING Internal Lab or Procedure     Annual Monitoring of Persistent Medications  (ACE/ARB, Digoxin, Diuretics)        Pota 01/29/2021 02/26/2021      FLU VAC High Dose 65 YRS & Older PRSV Free (94775)                          09/18/2018      Flublok Quad Influenza Vaccine (09983)                          10/16/2020      Fluvirin, 3 Years & >, Im Wt 240 lb (108.9 kg)   SpO2 99%   BMI 36.49 kg/m²    > BP Readings from Last 3 Encounters:  02/26/21 : 150/72  11/09/20 : 149/80  08/24/20 : 134/58    GENERAL: well developed, well nourished, in no apparent distress, obese  SKIN: no rashes, no suspicious l RANDOM URINE;  Future  - ASSAY, THYROID STIM HORMONE; Future  - URIC ACID, SERUM; Future  - VITAMIN D, 1,25 DIHYDROXY; Future  - PSA, DIAGNOSTIC; Future  - CBC WITH DIFFERENTIAL WITH PLATELET  - COMP METABOLIC PANEL (14)  - HEMOGLOBIN A1C  - LIPID PANEL  - without angina pectoris  He has a history of coronary artery disease but no heart related symptoms. Plan: Check labs again in 6 months.  - CBC WITH DIFFERENTIAL WITH PLATELET; Future  - COMP METABOLIC PANEL (14);  Future  - HEMOGLOBIN A1C; Future  - LIPID has hypothyroidism. His thyroid labs in August were normal.  Plan: Check labs again in August 2021. 11. Benign neoplasm of colon, unspecified part of colon  He has a history of a colon polyp but no sign of colon pathology now.   No new treatment recomme

## 2021-02-26 NOTE — PATIENT INSTRUCTIONS
Please schedule shingles vaccine sometime 4 weeks or more from now. Recheck in 6 months. Recommended Websites for Advanced Directives    [email or web address].no. org/publications/Documents/personal_[email or web address]. pdf  An information packet, including necessary form fr

## 2021-03-05 RX ORDER — INSULIN GLARGINE 100 [IU]/ML
INJECTION, SOLUTION SUBCUTANEOUS
Qty: 45 ML | Refills: 3 | Status: SHIPPED | OUTPATIENT
Start: 2021-03-05

## 2021-03-05 NOTE — TELEPHONE ENCOUNTER
Future appt:     Your appointments     Date & Time Appointment Department [facility])    Aug 31, 2021  2:00 PM CDT Follow up - Extended with Forest Kline MD [address] [place] [address], [place] ([facility])            Chet Arguello

## 2021-04-13 ENCOUNTER — TELEPHONE (OUTPATIENT)
Dept: FAMILY MEDICINE CLINIC | Facility: CLINIC | Age: 86
End: 2021-04-13

## 2021-04-13 DIAGNOSIS — Z79.4 TYPE 2 DIABETES MELLITUS WITH STAGE 3 CHRONIC KIDNEY DISEASE, WITH LONG-TERM CURRENT USE OF INSULIN (HCC): ICD-10-CM

## 2021-04-13 DIAGNOSIS — E11.22 TYPE 2 DIABETES MELLITUS WITH STAGE 3 CHRONIC KIDNEY DISEASE, WITH LONG-TERM CURRENT USE OF INSULIN (HCC): ICD-10-CM

## 2021-04-13 DIAGNOSIS — N18.30 TYPE 2 DIABETES MELLITUS WITH STAGE 3 CHRONIC KIDNEY DISEASE, WITH LONG-TERM CURRENT USE OF INSULIN (HCC): ICD-10-CM

## 2021-04-13 RX ORDER — INSULIN LISPRO 100 [IU]/ML
INJECTION, SOLUTION INTRAVENOUS; SUBCUTANEOUS
Qty: 15 PEN | Refills: 1 | Status: SHIPPED | OUTPATIENT
Start: 2021-04-13 | End: 2021-04-13

## 2021-04-13 RX ORDER — INSULIN LISPRO 100 [IU]/ML
INJECTION, SOLUTION INTRAVENOUS; SUBCUTANEOUS
Qty: 135 ML | Refills: 1 | Status: SHIPPED | OUTPATIENT
Start: 2021-04-13 | End: 2021-10-09

## 2021-04-13 NOTE — TELEPHONE ENCOUNTER
Humalo20    Future appt:     Your appointments     Date & Time Appointment Department [facility])    Aug 31, 2021  2:00 PM CDT Follow up - Extended with Efe Castillo MD [address] [place] [address] [place] ([place])

## 2021-07-03 DIAGNOSIS — E78.2 MIXED HYPERLIPIDEMIA: Primary | ICD-10-CM

## 2021-07-06 RX ORDER — PRAVASTATIN SODIUM 80 MG/1
TABLET ORAL
Qty: 90 TABLET | Refills: 1 | Status: SHIPPED | OUTPATIENT
Start: 2021-07-06 | End: 2021-12-22

## 2021-07-06 NOTE — TELEPHONE ENCOUNTER
Future appt:     Your appointments     Date & Time Appointment Department [facility])    Aug 31, 2021  2:00 PM CDT Follow up - Extended with Grace Zabala MD [address] [place], [place] [address]            [facility]

## 2021-08-06 RX ORDER — LEVOTHYROXINE SODIUM 0.07 MG/1
75 TABLET ORAL
Qty: 90 TABLET | Refills: 1 | Status: SHIPPED | OUTPATIENT
Start: 2021-08-06

## 2021-08-06 NOTE — TELEPHONE ENCOUNTER
Future appt:     Your appointments     Date & Time Appointment Department [facility])    Aug 31, 2021  2:00 PM CDT Follow up - Extended with Balwinder Nagy MD [address] [place] [address] [facility]            [facility]

## 2021-08-12 ENCOUNTER — PATIENT MESSAGE (OUTPATIENT)
Dept: FAMILY MEDICINE CLINIC | Facility: CLINIC | Age: 86
End: 2021-08-12

## 2021-08-13 NOTE — TELEPHONE ENCOUNTER
From: Pam Saenz  To: Jesika Coleman MD  Sent: 8/12/2021 4:57 PM CDT  Subject: Non-Urgent Medical Question    Dr. Kellie Kramer:    I am scheduled for a physical on August 31.  If there is a blood test to be done, could you please forward it to [employer]

## 2021-08-25 LAB
ABSOLUTE BASOPHILS: 52 CELLS/UL (ref 0–200)
ABSOLUTE EOSINOPHILS: 180 CELLS/UL (ref 15–500)
ABSOLUTE LYMPHOCYTES: 1380 CELLS/UL (ref 850–3900)
ABSOLUTE MONOCYTES: 597 CELLS/UL (ref 200–950)
ABSOLUTE NEUTROPHILS: 3590 CELLS/UL (ref 1500–7800)
ALBUMIN/GLOBULIN RATIO: 1.7 (CALC) (ref 1–2.5)
ALBUMIN: 4 G/DL (ref 3.6–5.1)
ALKALINE PHOSPHATASE: 63 U/L (ref 35–144)
ALT: 62 U/L (ref 9–46)
AST: 29 U/L (ref 10–35)
BASOPHILS: 0.9 %
BILIRUBIN, TOTAL: 0.6 MG/DL (ref 0.2–1.2)
BUN/CREATININE RATIO: 21 (CALC) (ref 6–22)
BUN: 25 MG/DL (ref 7–25)
CALCIUM: 9 MG/DL (ref 8.6–10.3)
CARBON DIOXIDE: 28 MMOL/L (ref 20–32)
CHLORIDE: 102 MMOL/L (ref 98–110)
CHOL/HDLC RATIO: 4.5 (CALC)
CHOLESTEROL, TOTAL: 145 MG/DL
CREATININE, RANDOM URINE: 175 MG/DL (ref 20–320)
CREATININE: 1.17 MG/DL (ref 0.7–1.11)
EGFR IF AFRICN AM: 66 ML/MIN/1.73M2
EGFR IF NONAFRICN AM: 57 ML/MIN/1.73M2
EOSINOPHILS: 3.1 %
GLOBULIN: 2.4 G/DL (CALC) (ref 1.9–3.7)
GLUCOSE: 180 MG/DL (ref 65–99)
HDL CHOLESTEROL: 32 MG/DL
HEMATOCRIT: 48 % (ref 38.5–50)
HEMOGLOBIN A1C: 7 % OF TOTAL HGB
HEMOGLOBIN: 16.1 G/DL (ref 13.2–17.1)
LDL-CHOLESTEROL: 70 MG/DL (CALC)
LYMPHOCYTES: 23.8 %
MCH: 29.8 PG (ref 27–33)
MCHC: 33.5 G/DL (ref 32–36)
MCV: 88.9 FL (ref 80–100)
MICROALBUMIN/CREATININE RATIO, RANDOM URINE: 9 MCG/MG CREAT
MICROALBUMIN: 1.6 MG/DL
MONOCYTES: 10.3 %
MPV: 11.8 FL (ref 7.5–12.5)
NEUTROPHILS: 61.9 %
NON-HDL CHOLESTEROL: 113 MG/DL (CALC)
PLATELET COUNT: 184 THOUSAND/UL (ref 140–400)
POTASSIUM: 4.8 MMOL/L (ref 3.5–5.3)
PROTEIN, TOTAL: 6.4 G/DL (ref 6.1–8.1)
PSA, TOTAL: <0.1 NG/ML
RDW: 13.4 % (ref 11–15)
RED BLOOD CELL COUNT: 5.4 MILLION/UL (ref 4.2–5.8)
SODIUM: 138 MMOL/L (ref 135–146)
TRIGLYCERIDES: 359 MG/DL
TSH: 3.86 MIU/L (ref 0.4–4.5)
URIC ACID: 8.2 MG/DL (ref 4–8)
VITAMIN D, 25-OH, TOTAL: 39 NG/ML (ref 30–100)
WHITE BLOOD CELL COUNT: 5.8 THOUSAND/UL (ref 3.8–10.8)

## 2021-08-31 ENCOUNTER — OFFICE VISIT (OUTPATIENT)
Dept: FAMILY MEDICINE CLINIC | Facility: CLINIC | Age: 86
End: 2021-08-31
Payer: COMMERCIAL

## 2021-08-31 VITALS
HEART RATE: 65 BPM | HEIGHT: 68 IN | DIASTOLIC BLOOD PRESSURE: 52 MMHG | SYSTOLIC BLOOD PRESSURE: 136 MMHG | OXYGEN SATURATION: 97 % | WEIGHT: 243 LBS | BODY MASS INDEX: 36.83 KG/M2 | RESPIRATION RATE: 16 BRPM | TEMPERATURE: 97 F

## 2021-08-31 DIAGNOSIS — Z79.4 TYPE 2 DIABETES MELLITUS WITH STAGE 3 CHRONIC KIDNEY DISEASE, WITH LONG-TERM CURRENT USE OF INSULIN, UNSPECIFIED WHETHER STAGE 3A OR 3B CKD (HCC): Primary | ICD-10-CM

## 2021-08-31 DIAGNOSIS — Z79.4 TYPE 2 DIABETES MELLITUS WITH STAGE 3A CHRONIC KIDNEY DISEASE, WITH LONG-TERM CURRENT USE OF INSULIN (HCC): ICD-10-CM

## 2021-08-31 DIAGNOSIS — N18.31 STAGE 3A CHRONIC KIDNEY DISEASE (HCC): ICD-10-CM

## 2021-08-31 DIAGNOSIS — E11.22 TYPE 2 DIABETES MELLITUS WITH STAGE 3 CHRONIC KIDNEY DISEASE, WITH LONG-TERM CURRENT USE OF INSULIN, UNSPECIFIED WHETHER STAGE 3A OR 3B CKD (HCC): Primary | ICD-10-CM

## 2021-08-31 DIAGNOSIS — E03.9 ACQUIRED HYPOTHYROIDISM: ICD-10-CM

## 2021-08-31 DIAGNOSIS — I25.10 ATHEROSCLEROSIS OF NATIVE CORONARY ARTERY OF NATIVE HEART WITHOUT ANGINA PECTORIS: ICD-10-CM

## 2021-08-31 DIAGNOSIS — I10 BENIGN ESSENTIAL HYPERTENSION: ICD-10-CM

## 2021-08-31 DIAGNOSIS — E11.22 TYPE 2 DIABETES MELLITUS WITH STAGE 3A CHRONIC KIDNEY DISEASE, WITH LONG-TERM CURRENT USE OF INSULIN (HCC): ICD-10-CM

## 2021-08-31 DIAGNOSIS — N18.30 TYPE 2 DIABETES MELLITUS WITH STAGE 3 CHRONIC KIDNEY DISEASE, WITH LONG-TERM CURRENT USE OF INSULIN, UNSPECIFIED WHETHER STAGE 3A OR 3B CKD (HCC): Primary | ICD-10-CM

## 2021-08-31 DIAGNOSIS — N18.31 TYPE 2 DIABETES MELLITUS WITH STAGE 3A CHRONIC KIDNEY DISEASE, WITH LONG-TERM CURRENT USE OF INSULIN (HCC): ICD-10-CM

## 2021-08-31 LAB
GLUCOSE BLOOD: 55
GLUCOSE BLOOD: 64
TEST STRIP LOT #: NORMAL NUMERIC
TEST STRIP LOT #: NORMAL NUMERIC

## 2021-08-31 PROCEDURE — 82947 ASSAY GLUCOSE BLOOD QUANT: CPT | Performed by: FAMILY MEDICINE

## 2021-08-31 PROCEDURE — 99214 OFFICE O/P EST MOD 30 MIN: CPT | Performed by: FAMILY MEDICINE

## 2021-08-31 PROCEDURE — 3078F DIAST BP <80 MM HG: CPT | Performed by: FAMILY MEDICINE

## 2021-08-31 PROCEDURE — 3075F SYST BP GE 130 - 139MM HG: CPT | Performed by: FAMILY MEDICINE

## 2021-08-31 PROCEDURE — 3008F BODY MASS INDEX DOCD: CPT | Performed by: FAMILY MEDICINE

## 2021-08-31 RX ORDER — AMLODIPINE BESYLATE 2.5 MG/1
TABLET ORAL
COMMUNITY
Start: 2021-06-17

## 2021-08-31 RX ORDER — FINASTERIDE 5 MG/1
5 TABLET, FILM COATED ORAL DAILY
COMMUNITY
Start: 2021-07-10

## 2021-08-31 NOTE — PROGRESS NOTES
[address]  PROGRESS NOTE  Chief Complaint:   Patient presents with: Follow - Up: 6 months   Lab Results: discuss      HPI:   This is a 82year old male coming in for follow-up on his diabetes. His blood sugar right now is low.   He s No    Family History:  Family History   Problem Relation Age of Onset   • Other (CAD) Father          at 47 of an MI   • Breast Cancer Mother         Breast cancer and COPD     Allergies:    Atorvastatin                Comment:Other reaction(s):  Other E11.22. Insulin Dependent 1 Device 0   • aspirin 325 MG Oral Tab Take 325 mg by mouth daily. • Meclizine HCl 12.5 MG Oral Tab Take 1 tablet (12.5 mg total) by mouth 3 (three) times daily as needed.  30 tablet 0   • DiphenhydrAMINE HCl (BENADRYL) 25 MG this encounter: 5' 8\" (1.727 m). Weight as of this encounter: 243 lb (110.2 kg). Vital signs reviewed. Appears stated age, well groomed.   Physical Exam:  GEN:  Patient is alert, awake and oriented, well developed, well nourished, no apparent distres angina pectoris  He has a history of coronary artery disease but no heart related symptoms now. 5. Acquired hypothyroidism  He has a history of hypothyroidism. His TSH level is normal now.     6. Stage 3a chronic kidney disease (HCC)  Slight decrease in

## 2021-09-30 NOTE — TELEPHONE ENCOUNTER
Garrett-guard test is negative.   Please notify patient
Informed pt of his colo-guard test. Pt expressed understanding and thanks.
severe

## 2021-10-09 DIAGNOSIS — N18.30 TYPE 2 DIABETES MELLITUS WITH STAGE 3 CHRONIC KIDNEY DISEASE, WITH LONG-TERM CURRENT USE OF INSULIN (HCC): ICD-10-CM

## 2021-10-09 DIAGNOSIS — Z79.4 TYPE 2 DIABETES MELLITUS WITH STAGE 3 CHRONIC KIDNEY DISEASE, WITH LONG-TERM CURRENT USE OF INSULIN (HCC): ICD-10-CM

## 2021-10-09 DIAGNOSIS — E11.22 TYPE 2 DIABETES MELLITUS WITH STAGE 3 CHRONIC KIDNEY DISEASE, WITH LONG-TERM CURRENT USE OF INSULIN (HCC): ICD-10-CM

## 2021-10-09 NOTE — TELEPHONE ENCOUNTER
Future appt:    Last Appointment with provider:   8/31/2021  [facility]:  8/31/2021  CHOLESTEROL, TOTAL (mg/dL)   Date Value   08/24/2021 145     HDL CHOLESTEROL (mg/dL)   Date Value   08/24/2021 32 (L)     LDL-CHOLESTEROL (mg/dL (selena

## 2021-10-11 RX ORDER — INSULIN LISPRO 100 [IU]/ML
INJECTION, SOLUTION INTRAVENOUS; SUBCUTANEOUS
Qty: 135 ML | Refills: 1 | Status: SHIPPED | OUTPATIENT
Start: 2021-10-11

## 2021-11-15 DIAGNOSIS — E11.22 TYPE 2 DIABETES MELLITUS WITH STAGE 3 CHRONIC KIDNEY DISEASE, WITH LONG-TERM CURRENT USE OF INSULIN (HCC): ICD-10-CM

## 2021-11-15 DIAGNOSIS — N18.30 TYPE 2 DIABETES MELLITUS WITH STAGE 3 CHRONIC KIDNEY DISEASE, WITH LONG-TERM CURRENT USE OF INSULIN (HCC): ICD-10-CM

## 2021-11-15 DIAGNOSIS — Z79.4 TYPE 2 DIABETES MELLITUS WITH STAGE 3 CHRONIC KIDNEY DISEASE, WITH LONG-TERM CURRENT USE OF INSULIN (HCC): ICD-10-CM

## 2021-11-15 RX ORDER — PEN NEEDLE, DIABETIC 31 GX5/16"
NEEDLE, DISPOSABLE MISCELLANEOUS
Qty: 400 EACH | Refills: 3 | Status: SHIPPED | OUTPATIENT
Start: 2021-11-15

## 2021-11-15 NOTE — TELEPHONE ENCOUNTER
Future appt:    Last Appointment with provider:   8/31/2021  Last appointment at [facility]:  8/31/2021  CHOLESTEROL, TOTAL (mg/dL)   Date Value   08/24/2021 145     HDL CHOLESTEROL (mg/dL)   Date Value   08/24/2021 32 (L)     LDL-CHOLESTEROL (mg/dL (selena

## 2021-11-18 ENCOUNTER — TELEPHONE (OUTPATIENT)
Dept: FAMILY MEDICINE CLINIC | Facility: CLINIC | Age: 86
End: 2021-11-18

## 2021-11-18 DIAGNOSIS — E03.9 ACQUIRED HYPOTHYROIDISM: ICD-10-CM

## 2021-11-18 DIAGNOSIS — E55.9 VITAMIN D DEFICIENCY: ICD-10-CM

## 2021-11-18 DIAGNOSIS — N18.31 TYPE 2 DIABETES MELLITUS WITH STAGE 3A CHRONIC KIDNEY DISEASE, WITH LONG-TERM CURRENT USE OF INSULIN (HCC): Primary | ICD-10-CM

## 2021-11-18 DIAGNOSIS — E11.22 TYPE 2 DIABETES MELLITUS WITH STAGE 3A CHRONIC KIDNEY DISEASE, WITH LONG-TERM CURRENT USE OF INSULIN (HCC): Primary | ICD-10-CM

## 2021-11-18 DIAGNOSIS — I10 BENIGN ESSENTIAL HYPERTENSION: ICD-10-CM

## 2021-11-18 DIAGNOSIS — Z79.4 TYPE 2 DIABETES MELLITUS WITH STAGE 3A CHRONIC KIDNEY DISEASE, WITH LONG-TERM CURRENT USE OF INSULIN (HCC): Primary | ICD-10-CM

## 2021-11-18 DIAGNOSIS — E78.2 MIXED HYPERLIPIDEMIA: ICD-10-CM

## 2021-11-18 NOTE — TELEPHONE ENCOUNTER
has MA  2/2022   (    will be calling for BW orders prior to be faxed to Shyanne London )     OTW   thank you         Future Appointments   Date Time Provider Myla Gamez   2/21/2022 10:30 AM Conrad Pina MD [facility]

## 2021-12-22 DIAGNOSIS — E78.2 MIXED HYPERLIPIDEMIA: ICD-10-CM

## 2021-12-22 RX ORDER — PRAVASTATIN SODIUM 80 MG/1
80 TABLET ORAL DAILY
Qty: 90 TABLET | Refills: 0 | Status: SHIPPED | OUTPATIENT
Start: 2021-12-22 | End: 2022-03-28

## 2021-12-22 NOTE — TELEPHONE ENCOUNTER
Pravastatin: 7/6/21     Return in about 6 months (around 2/28/2022). Future appt: Your appointments     Date & Time Appointment Department [facility])    Feb 21, 2022 10:30 AM OFELIA QUARLES Supervisit with Patel Medrano MD [address] [place] [address])            [address] [place] [address] [facility] [phone]  [phone]        Last Appointment with provider:   8/31/2021  [facility] appointment [place] [facility]:  8/31/2021  CHOLESTEROL, TOTAL (mg/dL)   Date Value   08/24/2021 145     HDL CHOLESTEROL (mg/dL)   Date Value   08/24/2021 32 (L)     LDL-CHOLESTEROL (mg/dL (calc))   Date Value   08/24/2021 70     TRIGLYCERIDES (mg/dL)   Date Value   08/24/2021 359 (H)     Lab Results   Component Value Date     (H) 11/09/2020    A1C 7.0 (H) 08/24/2021     Lab Results   Component Value Date    T4F 1.0 11/07/2018    TSH 3.86 08/24/2021       No follow-ups on file.

## 2021-12-27 DIAGNOSIS — E78.2 MIXED HYPERLIPIDEMIA: ICD-10-CM

## 2021-12-27 RX ORDER — PRAVASTATIN SODIUM 80 MG/1
TABLET ORAL
Qty: 90 TABLET | Refills: 0 | OUTPATIENT
Start: 2021-12-27

## 2022-01-28 ENCOUNTER — PATIENT MESSAGE (OUTPATIENT)
Dept: FAMILY MEDICINE CLINIC | Facility: CLINIC | Age: 87
End: 2022-01-28

## 2022-01-28 NOTE — TELEPHONE ENCOUNTER
From: Prerna Mayorga  To: Pablito Murray MD  Sent: 1/28/2022 8:43 AM CST  Subject: blood test    I have a blood test on Feb 1 for my appointment later in February. Could someone make sure that [facility] has the test requirements?     Thanks,    Blayne Hansen

## 2022-01-28 NOTE — TELEPHONE ENCOUNTER
From: Davie Pozo  To: Sherie Brown MD  Sent: 1/28/2022 8:43 AM CST  Subject: blood test    I have a blood test on Feb 1 for my appointment later in February. Could someone make sure that [facility] has the test requirements?     Thanks,    Ricky Perez

## 2022-02-02 LAB
ABSOLUTE BASOPHILS: 82 CELLS/UL (ref 0–200)
ABSOLUTE EOSINOPHILS: 271 CELLS/UL (ref 15–500)
ABSOLUTE LYMPHOCYTES: 1726 CELLS/UL (ref 850–3900)
ABSOLUTE MONOCYTES: 693 CELLS/UL (ref 200–950)
ABSOLUTE NEUTROPHILS: 3528 CELLS/UL (ref 1500–7800)
ALBUMIN/GLOBULIN RATIO: 1.8 (CALC) (ref 1–2.5)
ALBUMIN: 4.3 G/DL (ref 3.6–5.1)
ALKALINE PHOSPHATASE: 60 U/L (ref 37–153)
ALT: 48 U/L (ref 6–29)
AST: 22 U/L (ref 10–35)
BASOPHILS: 1.3 %
BILIRUBIN, TOTAL: 0.6 MG/DL (ref 0.2–1.2)
BUN/CREATININE RATIO: 18 (CALC) (ref 6–22)
CALCIUM: 9.4 MG/DL (ref 8.6–10.4)
CARBON DIOXIDE: 29 MMOL/L (ref 20–32)
CHLORIDE: 103 MMOL/L (ref 98–110)
CHOL/HDLC RATIO: 4.4 (CALC)
CHOLESTEROL, TOTAL: 153 MG/DL
CREATININE, RANDOM URINE: 165 MG/DL (ref 20–275)
CREATININE: 1.04 MG/DL (ref 0.6–0.88)
EGFR IF AFRICN AM: 57 ML/MIN/1.73M2
EGFR IF NONAFRICN AM: 49 ML/MIN/1.73M2
EOSINOPHILS: 4.3 %
GLOBULIN: 2.4 G/DL (CALC) (ref 1.9–3.7)
GLUCOSE: 156 MG/DL (ref 65–99)
HDL CHOLESTEROL: 35 MG/DL
HEMATOCRIT: 49.9 % (ref 35–45)
HEMOGLOBIN A1C: 6.8 % OF TOTAL HGB
HEMOGLOBIN: 16.6 G/DL (ref 11.7–15.5)
LDL-CHOLESTEROL: 82 MG/DL (CALC)
LYMPHOCYTES: 27.4 %
MCH: 29.5 PG (ref 27–33)
MCHC: 33.3 G/DL (ref 32–36)
MCV: 88.6 FL (ref 80–100)
MICROALBUMIN/CREATININE RATIO, RANDOM URINE: 8 MCG/MG CREAT
MICROALBUMIN: 1.4 MG/DL
MONOCYTES: 11 %
MPV: 11.9 FL (ref 7.5–12.5)
NEUTROPHILS: 56 %
NON-HDL CHOLESTEROL: 118 MG/DL (CALC)
PLATELET COUNT: 206 THOUSAND/UL (ref 140–400)
POTASSIUM: 4.6 MMOL/L (ref 3.5–5.3)
PROTEIN, TOTAL: 6.7 G/DL (ref 6.1–8.1)
PSA, TOTAL: 0.1 NG/ML
RDW: 13.3 % (ref 11–15)
RED BLOOD CELL COUNT: 5.63 MILLION/UL (ref 3.8–5.1)
SODIUM: 140 MMOL/L (ref 135–146)
TRIGLYCERIDES: 272 MG/DL
TSH: 5.23 MIU/L (ref 0.4–4.5)
URIC ACID: 7.2 MG/DL (ref 2.5–7)
VITAMIN D, 25-OH, TOTAL: 52 NG/ML (ref 30–100)
WHITE BLOOD CELL COUNT: 6.3 THOUSAND/UL (ref 3.8–10.8)

## 2022-02-08 RX ORDER — LEVOTHYROXINE SODIUM 0.07 MG/1
75 TABLET ORAL
Qty: 90 TABLET | Refills: 1 | Status: SHIPPED | OUTPATIENT
Start: 2022-02-08

## 2022-02-21 ENCOUNTER — OFFICE VISIT (OUTPATIENT)
Dept: FAMILY MEDICINE CLINIC | Facility: CLINIC | Age: 87
End: 2022-02-21
Payer: COMMERCIAL

## 2022-02-21 VITALS
BODY MASS INDEX: 37.89 KG/M2 | OXYGEN SATURATION: 99 % | DIASTOLIC BLOOD PRESSURE: 72 MMHG | SYSTOLIC BLOOD PRESSURE: 136 MMHG | WEIGHT: 250 LBS | HEIGHT: 68 IN | HEART RATE: 60 BPM | TEMPERATURE: 97 F | RESPIRATION RATE: 18 BRPM

## 2022-02-21 DIAGNOSIS — N40.1 BENIGN PROSTATIC HYPERPLASIA WITH URINARY FREQUENCY: ICD-10-CM

## 2022-02-21 DIAGNOSIS — I10 BENIGN ESSENTIAL HYPERTENSION: ICD-10-CM

## 2022-02-21 DIAGNOSIS — E55.9 VITAMIN D DEFICIENCY: ICD-10-CM

## 2022-02-21 DIAGNOSIS — Z95.5 PRESENCE OF STENT IN CORONARY ARTERY: ICD-10-CM

## 2022-02-21 DIAGNOSIS — R42 DIZZINESS: ICD-10-CM

## 2022-02-21 DIAGNOSIS — N18.31 STAGE 3A CHRONIC KIDNEY DISEASE (HCC): ICD-10-CM

## 2022-02-21 DIAGNOSIS — D12.6 BENIGN NEOPLASM OF COLON, UNSPECIFIED PART OF COLON: ICD-10-CM

## 2022-02-21 DIAGNOSIS — L57.0 ACTINIC KERATOSIS: ICD-10-CM

## 2022-02-21 DIAGNOSIS — E20.0 IDIOPATHIC HYPOPARATHYROIDISM (HCC): ICD-10-CM

## 2022-02-21 DIAGNOSIS — I65.23 BILATERAL CAROTID ARTERY OCCLUSION: ICD-10-CM

## 2022-02-21 DIAGNOSIS — Z00.00 ENCOUNTER FOR ANNUAL HEALTH EXAMINATION: Primary | ICD-10-CM

## 2022-02-21 DIAGNOSIS — E78.2 MIXED HYPERLIPIDEMIA: ICD-10-CM

## 2022-02-21 DIAGNOSIS — R35.0 BENIGN PROSTATIC HYPERPLASIA WITH URINARY FREQUENCY: ICD-10-CM

## 2022-02-21 DIAGNOSIS — Z98.61 POSTSURGICAL PERCUTANEOUS TRANSLUMINAL CORONARY ANGIOPLASTY STATUS: ICD-10-CM

## 2022-02-21 DIAGNOSIS — E03.9 ACQUIRED HYPOTHYROIDISM: ICD-10-CM

## 2022-02-21 DIAGNOSIS — E66.01 CLASS 2 SEVERE OBESITY DUE TO EXCESS CALORIES WITH SERIOUS COMORBIDITY AND BODY MASS INDEX (BMI) OF 38.0 TO 38.9 IN ADULT (HCC): ICD-10-CM

## 2022-02-21 DIAGNOSIS — N18.31 TYPE 2 DIABETES MELLITUS WITH STAGE 3A CHRONIC KIDNEY DISEASE, WITH LONG-TERM CURRENT USE OF INSULIN (HCC): ICD-10-CM

## 2022-02-21 DIAGNOSIS — I25.9 CHRONIC ISCHEMIC HEART DISEASE: ICD-10-CM

## 2022-02-21 DIAGNOSIS — E11.22 TYPE 2 DIABETES MELLITUS WITH STAGE 3A CHRONIC KIDNEY DISEASE, WITH LONG-TERM CURRENT USE OF INSULIN (HCC): ICD-10-CM

## 2022-02-21 DIAGNOSIS — I25.10 ATHEROSCLEROSIS OF NATIVE CORONARY ARTERY OF NATIVE HEART WITHOUT ANGINA PECTORIS: ICD-10-CM

## 2022-02-21 DIAGNOSIS — Z79.4 TYPE 2 DIABETES MELLITUS WITH STAGE 3A CHRONIC KIDNEY DISEASE, WITH LONG-TERM CURRENT USE OF INSULIN (HCC): ICD-10-CM

## 2022-02-21 PROCEDURE — 3078F DIAST BP <80 MM HG: CPT | Performed by: FAMILY MEDICINE

## 2022-02-21 PROCEDURE — 3075F SYST BP GE 130 - 139MM HG: CPT | Performed by: FAMILY MEDICINE

## 2022-02-21 PROCEDURE — 3008F BODY MASS INDEX DOCD: CPT | Performed by: FAMILY MEDICINE

## 2022-02-21 PROCEDURE — 96160 PT-FOCUSED HLTH RISK ASSMT: CPT | Performed by: FAMILY MEDICINE

## 2022-02-21 PROCEDURE — G0439 PPPS, SUBSEQ VISIT: HCPCS | Performed by: FAMILY MEDICINE

## 2022-02-21 PROCEDURE — 99397 PER PM REEVAL EST PAT 65+ YR: CPT | Performed by: FAMILY MEDICINE

## 2022-02-21 RX ORDER — FLUOROURACIL 50 MG/G
CREAM TOPICAL
COMMUNITY
Start: 2022-02-18

## 2022-02-21 RX ORDER — ALCLOMETASONE DIPROPIONATE 0.5 MG/G
OINTMENT TOPICAL
COMMUNITY
Start: 2022-02-16

## 2022-03-07 RX ORDER — LANCETS
EACH MISCELLANEOUS
Qty: 300 EACH | Refills: 1 | Status: SHIPPED | OUTPATIENT
Start: 2022-03-07

## 2022-03-07 NOTE — TELEPHONE ENCOUNTER
Return in 6 months (on 8/21/2022). Future appt: Your appointments     Date & Time Appointment Department [facility])    Sep 02, 2022  2:45 PM CDT Follow Up Visit with Do Stauffer MD 25 [facility] ([facility])            25 [facility]ie  [facility] [phone]  [phone]        Last Appointment with provider:   2/21/2022  Last appointment at [facility] [place]:  2/21/2022  CHOLESTEROL, TOTAL (mg/dL)   Date Value   02/01/2022 153     HDL CHOLESTEROL (mg/dL)   Date Value   02/01/2022 35 (L)     LDL-CHOLESTEROL (mg/dL (calc))   Date Value   02/01/2022 82     TRIGLYCERIDES (mg/dL)   Date Value   02/01/2022 272 (H)     Lab Results   Component Value Date     (H) 11/09/2020    A1C 6.8 (H) 02/01/2022     Lab Results   Component Value Date    T4F 1.0 11/07/2018    TSH 5.23 (H) 02/01/2022       No follow-ups on file.

## 2022-03-09 RX ORDER — INSULIN GLARGINE 100 [IU]/ML
INJECTION, SOLUTION SUBCUTANEOUS
Qty: 45 ML | Refills: 1 | Status: SHIPPED | OUTPATIENT
Start: 2022-03-09

## 2022-03-09 NOTE — TELEPHONE ENCOUNTER
Lantus:  3/5/21    Future appt: Your appointments     Date & Time Appointment Department [facility])    Sep 02, 2022  2:45 PM CDT Follow Up Visit with Howie Vargas MD 25 [facility], Alcides Breaux ([facility])            25 [facility] Sofie  [phone]  [phone]        Last Appointment with provider:   2/21/2022  Last appointment at [facility]:  2/21/2022  CHOLESTEROL, TOTAL (mg/dL)   Date Value   02/01/2022 153     HDL CHOLESTEROL (mg/dL)   Date Value   02/01/2022 35 (L)     LDL-CHOLESTEROL (mg/dL (calc))   Date Value   02/01/2022 82     TRIGLYCERIDES (mg/dL)   Date Value   02/01/2022 272 (H)     Lab Results   Component Value Date     (H) 11/09/2020    A1C 6.8 (H) 02/01/2022     Lab Results   Component Value Date    T4F 1.0 11/07/2018    TSH 5.23 (H) 02/01/2022       No follow-ups on file.

## 2022-03-28 RX ORDER — PRAVASTATIN SODIUM 80 MG/1
80 TABLET ORAL DAILY
Qty: 90 TABLET | Refills: 1 | Status: SHIPPED | OUTPATIENT
Start: 2022-03-28

## 2022-03-28 NOTE — TELEPHONE ENCOUNTER
Future appt: Your appointments     Date & Time Appointment Department [facility])    Sep 02, 2022  2:45 PM CDT Follow Up Visit with Kenna Ozuna MD 25 [facility] Jose Elias ([facility])            25 [facility] Sofie  [phone]  [phone]        Last Appointment with provider:   2/21/2022  Last appointment at [facility]:  2/21/2022- PX    Pravastatin refilled 12/22/21 for #90, 0 refills      CHOLESTEROL, TOTAL (mg/dL)   Date Value   02/01/2022 153     HDL CHOLESTEROL (mg/dL)   Date Value   02/01/2022 35 (L)     LDL-CHOLESTEROL (mg/dL (calc))   Date Value   02/01/2022 82     TRIGLYCERIDES (mg/dL)   Date Value   02/01/2022 272 (H)     Lab Results   Component Value Date     (H) 11/09/2020    A1C 6.8 (H) 02/01/2022     Lab Results   Component Value Date    T4F 1.0 11/07/2018    TSH 5.23 (H) 02/01/2022       No follow-ups on file.

## 2022-04-05 RX ORDER — BLOOD SUGAR DIAGNOSTIC
STRIP MISCELLANEOUS
Qty: 300 STRIP | Refills: 3 | Status: SHIPPED | OUTPATIENT
Start: 2022-04-05

## 2022-04-05 NOTE — TELEPHONE ENCOUNTER
Future appt: Your appointments     Date & Time Appointment Department [facility])    Sep 02, 2022  2:45 PM CDT Follow Up Visit with [facility])            25 [facility], Dony Carrizales  [facility] 1076 [phone]  [phone]        Last Appointment with provider:   2/21/2022  Last appointment at [facility] [place]:  2/21/2022  CHOLESTEROL, TOTAL (mg/dL)   Date Value   02/01/2022 153     HDL CHOLESTEROL (mg/dL)   Date Value   02/01/2022 35 (L)     LDL-CHOLESTEROL (mg/dL (calc))   Date Value   02/01/2022 82     TRIGLYCERIDES (mg/dL)   Date Value   02/01/2022 272 (H)     Lab Results   Component Value Date     (H) 11/09/2020    A1C 6.8 (H) 02/01/2022     Lab Results   Component Value Date    T4F 1.0 11/07/2018    TSH 5.23 (H) 02/01/2022       No follow-ups on file.

## 2022-04-13 ENCOUNTER — TELEPHONE (OUTPATIENT)
Dept: FAMILY MEDICINE CLINIC | Facility: CLINIC | Age: 87
End: 2022-04-13

## 2022-04-13 NOTE — TELEPHONE ENCOUNTER
Form mailed to [facility].   Lisa Arzate CMA, 04/13/22, 1:27 PM

## 2022-04-14 NOTE — TELEPHONE ENCOUNTER
Future appt: Your appointments     Date & Time Appointment Department [facility])    Sep 02, 2022  2:45 PM CDT Follow Up Visit with Pebbles Vinson MD 25 [facility], [facility] ([place])            [address] [place] [address] [facility] [address]  [phone]          Last Appointment with provider:   2/21/2022 - px  Last appointment at [facility]:  2/21/2022      CHOLESTEROL, TOTAL (mg/dL)   Date Value   02/01/2022 153     HDL CHOLESTEROL (mg/dL)   Date Value   02/01/2022 35 (L)     LDL-CHOLESTEROL (mg/dL (calc))   Date Value   02/01/2022 82     TRIGLYCERIDES (mg/dL)   Date Value   02/01/2022 272 (H)     Lab Results   Component Value Date     (H) 11/09/2020    A1C 6.8 (H) 02/01/2022     Lab Results   Component Value Date    T4F 1.0 11/07/2018    TSH 5.23 (H) 02/01/2022       No follow-ups on file.

## 2022-04-15 RX ORDER — INSULIN LISPRO 100 [IU]/ML
INJECTION, SOLUTION INTRAVENOUS; SUBCUTANEOUS
Qty: 135 ML | Refills: 1 | Status: SHIPPED | OUTPATIENT
Start: 2022-04-15

## 2022-04-22 ENCOUNTER — PATIENT MESSAGE (OUTPATIENT)
Dept: FAMILY MEDICINE CLINIC | Facility: CLINIC | Age: 87
End: 2022-04-22

## 2022-04-22 NOTE — TELEPHONE ENCOUNTER
From: Tono Petersen  To: Luan Wilson MD  Sent: 4/22/2022 8:46 AM CDT  Subject: angiogram    I have recently experienced chest pains when walking. I was able to schedule an appointment yesterday with Dr. Rocio Ruiz. As a result, I am scheduled to have an angiogram Tuesday at 1 p.m. with Dr. Rocio Ruiz at [facility]. I may or may not spend the night at the hospital that night. I wanted to let you know.      Enma Joseph  [birth year]

## 2022-05-02 ENCOUNTER — PATIENT MESSAGE (OUTPATIENT)
Dept: FAMILY MEDICINE CLINIC | Facility: CLINIC | Age: 87
End: 2022-05-02

## 2022-05-02 NOTE — TELEPHONE ENCOUNTER
From: Sandy Carlson  To: Lucía Nicolas MD  Sent: 5/2/2022 8:49 AM CDT  Subject: quinapril and angioplasty    What should I do about quinapril? Dr. Adalid Pryor said my angioplasty went well. I spent one night in the hospital afterwards. He added plavix and hydrochrolothiazide to my medications. I seem to be doing pretty well.     Jeri Liang

## 2022-06-02 NOTE — TELEPHONE ENCOUNTER
Doesn't look like this is filled by our office. Please advise. Future appt: Your appointments     Date & Time Appointment Department [facility])    Sep 02, 2022  2:45 PM CDT Follow Up Visit with Laverne Hicks MD [address] [facility] ([place])            [address] [place] [address] Sofie  [facility] 1076 [phone]  [phone]        Last Appointment with provider:   2/21/2022  [facility] appointment [facility] [place]:  2/21/2022  CHOLESTEROL, TOTAL (mg/dL)   Date Value   02/01/2022 153     HDL CHOLESTEROL (mg/dL)   Date Value   02/01/2022 35 (L)     LDL-CHOLESTEROL (mg/dL (calc))   Date Value   02/01/2022 82     TRIGLYCERIDES (mg/dL)   Date Value   02/01/2022 272 (H)     Lab Results   Component Value Date     (H) 11/09/2020    A1C 6.8 (H) 02/01/2022     Lab Results   Component Value Date    T4F 1.0 11/07/2018    TSH 5.23 (H) 02/01/2022       No follow-ups on file.

## 2022-06-03 RX ORDER — AMLODIPINE BESYLATE 2.5 MG/1
TABLET ORAL
Qty: 90 TABLET | Refills: 1 | Status: SHIPPED | OUTPATIENT
Start: 2022-06-03

## 2022-06-29 ENCOUNTER — TELEPHONE (OUTPATIENT)
Dept: FAMILY MEDICINE CLINIC | Facility: CLINIC | Age: 87
End: 2022-06-29

## 2022-06-29 DIAGNOSIS — N18.31 TYPE 2 DIABETES MELLITUS WITH STAGE 3A CHRONIC KIDNEY DISEASE, WITH LONG-TERM CURRENT USE OF INSULIN (HCC): Primary | ICD-10-CM

## 2022-06-29 DIAGNOSIS — Z79.4 TYPE 2 DIABETES MELLITUS WITH STAGE 3A CHRONIC KIDNEY DISEASE, WITH LONG-TERM CURRENT USE OF INSULIN (HCC): Primary | ICD-10-CM

## 2022-06-29 DIAGNOSIS — E11.22 TYPE 2 DIABETES MELLITUS WITH STAGE 3A CHRONIC KIDNEY DISEASE, WITH LONG-TERM CURRENT USE OF INSULIN (HCC): Primary | ICD-10-CM

## 2022-08-03 RX ORDER — LEVOTHYROXINE SODIUM 0.07 MG/1
TABLET ORAL
Qty: 90 TABLET | Refills: 1 | Status: SHIPPED | OUTPATIENT
Start: 2022-08-03

## 2022-08-03 NOTE — TELEPHONE ENCOUNTER
Future appt: Your appointments     Date & Time Appointment Department [facility])    Sep 02, 2022  2:45 PM CDT Follow Up Visit with Vashti Downs MD 25 [facility], Hamzah Sylvester ([facility])            25 [facility] [place] Sofie  [facility] 1076 [phone]        Last Appointment with provider:   2/21/2022 Physical  [facility] appointment [facility]:  2/21/2022  CHOLESTEROL, TOTAL (mg/dL)   Date Value   02/01/2022 153     HDL CHOLESTEROL (mg/dL)   Date Value   02/01/2022 35 (L)     LDL-CHOLESTEROL (mg/dL (calc))   Date Value   02/01/2022 82     TRIGLYCERIDES (mg/dL)   Date Value   02/01/2022 272 (H)     Lab Results   Component Value Date     (H) 11/09/2020    A1C 6.8 (H) 02/01/2022     Lab Results   Component Value Date    T4F 1.0 11/07/2018    TSH 5.23 (H) 02/01/2022       No follow-ups on file.

## 2022-08-27 LAB
ALBUMIN/GLOBULIN RATIO: 1.9 (CALC) (ref 1–2.5)
ALBUMIN: 4.1 G/DL (ref 3.6–5.1)
ALKALINE PHOSPHATASE: 67 U/L (ref 37–153)
ALT: 54 U/L (ref 6–29)
AST: 30 U/L (ref 10–35)
BILIRUBIN, TOTAL: 0.5 MG/DL (ref 0.2–1.2)
BUN/CREATININE RATIO: 21 (CALC) (ref 6–22)
BUN: 28 MG/DL (ref 7–25)
CALCIUM: 9.2 MG/DL (ref 8.6–10.4)
CARBON DIOXIDE: 28 MMOL/L (ref 20–32)
CHLORIDE: 100 MMOL/L (ref 98–110)
CREATININE: 1.31 MG/DL (ref 0.6–0.95)
EGFR: 40 ML/MIN/1.73M2
GLOBULIN: 2.2 G/DL (CALC) (ref 1.9–3.7)
GLUCOSE: 172 MG/DL (ref 65–99)
HEMOGLOBIN A1C: 6.4 % OF TOTAL HGB
POTASSIUM: 4.5 MMOL/L (ref 3.5–5.3)
PROTEIN, TOTAL: 6.3 G/DL (ref 6.1–8.1)
SODIUM: 136 MMOL/L (ref 135–146)

## 2022-09-02 ENCOUNTER — OFFICE VISIT (OUTPATIENT)
Dept: FAMILY MEDICINE CLINIC | Facility: CLINIC | Age: 87
End: 2022-09-02
Payer: COMMERCIAL

## 2022-09-02 VITALS
BODY MASS INDEX: 36.92 KG/M2 | TEMPERATURE: 97 F | HEIGHT: 68 IN | RESPIRATION RATE: 18 BRPM | OXYGEN SATURATION: 95 % | WEIGHT: 243.63 LBS | DIASTOLIC BLOOD PRESSURE: 56 MMHG | HEART RATE: 64 BPM | SYSTOLIC BLOOD PRESSURE: 110 MMHG

## 2022-09-02 DIAGNOSIS — I10 BENIGN ESSENTIAL HYPERTENSION: ICD-10-CM

## 2022-09-02 DIAGNOSIS — N18.31 TYPE 2 DIABETES MELLITUS WITH STAGE 3A CHRONIC KIDNEY DISEASE, WITH LONG-TERM CURRENT USE OF INSULIN (HCC): Primary | ICD-10-CM

## 2022-09-02 DIAGNOSIS — E20.0 IDIOPATHIC HYPOPARATHYROIDISM (HCC): ICD-10-CM

## 2022-09-02 DIAGNOSIS — I25.9 CHRONIC ISCHEMIC HEART DISEASE: ICD-10-CM

## 2022-09-02 DIAGNOSIS — N18.31 STAGE 3A CHRONIC KIDNEY DISEASE (HCC): ICD-10-CM

## 2022-09-02 DIAGNOSIS — E66.01 CLASS 2 SEVERE OBESITY DUE TO EXCESS CALORIES WITH SERIOUS COMORBIDITY AND BODY MASS INDEX (BMI) OF 37.0 TO 37.9 IN ADULT (HCC): ICD-10-CM

## 2022-09-02 DIAGNOSIS — Z79.4 TYPE 2 DIABETES MELLITUS WITH STAGE 3A CHRONIC KIDNEY DISEASE, WITH LONG-TERM CURRENT USE OF INSULIN (HCC): Primary | ICD-10-CM

## 2022-09-02 DIAGNOSIS — E11.22 TYPE 2 DIABETES MELLITUS WITH STAGE 3A CHRONIC KIDNEY DISEASE, WITH LONG-TERM CURRENT USE OF INSULIN (HCC): Primary | ICD-10-CM

## 2022-09-02 DIAGNOSIS — I25.10 ATHEROSCLEROSIS OF NATIVE CORONARY ARTERY OF NATIVE HEART WITHOUT ANGINA PECTORIS: ICD-10-CM

## 2022-09-02 DIAGNOSIS — G62.9 NEUROPATHY: ICD-10-CM

## 2022-09-02 DIAGNOSIS — Z95.5 PRESENCE OF STENT IN CORONARY ARTERY: ICD-10-CM

## 2022-09-02 DIAGNOSIS — E78.2 MIXED HYPERLIPIDEMIA: ICD-10-CM

## 2022-09-02 PROBLEM — R07.9 CHEST PAIN: Status: RESOLVED | Noted: 2022-04-21 | Resolved: 2022-09-02

## 2022-09-02 PROBLEM — R06.00 DYSPNEA: Status: ACTIVE | Noted: 2022-04-21

## 2022-09-02 PROBLEM — R07.9 CHEST PAIN: Status: ACTIVE | Noted: 2022-04-21

## 2022-09-02 PROCEDURE — 3078F DIAST BP <80 MM HG: CPT | Performed by: FAMILY MEDICINE

## 2022-09-02 PROCEDURE — 3074F SYST BP LT 130 MM HG: CPT | Performed by: FAMILY MEDICINE

## 2022-09-02 PROCEDURE — 99214 OFFICE O/P EST MOD 30 MIN: CPT | Performed by: FAMILY MEDICINE

## 2022-09-02 PROCEDURE — 3008F BODY MASS INDEX DOCD: CPT | Performed by: FAMILY MEDICINE

## 2022-09-02 RX ORDER — HYDROCHLOROTHIAZIDE 25 MG/1
TABLET ORAL
COMMUNITY
Start: 2022-07-22

## 2022-09-02 RX ORDER — CLOTRIMAZOLE 1 %
CREAM (GRAM) TOPICAL
COMMUNITY
Start: 2022-08-29

## 2022-09-02 RX ORDER — CLOPIDOGREL BISULFATE 75 MG/1
TABLET ORAL
COMMUNITY
Start: 2022-07-22

## 2022-09-08 NOTE — TELEPHONE ENCOUNTER
Future appt:    Last Appointment with provider:   9/2/2022  Last appointment at [facility]:  9/2/2022    Last physical 2/21/22    Last refill 3/9/22 #45 mL with 1 refill        CHOLESTEROL, TOTAL (mg/dL)   Date Value   02/01/2022 153     HDL CHOLESTEROL (mg/dL)   Date Value   02/01/2022 35 (L)     LDL-CHOLESTEROL (mg/dL (calc))   Date Value   02/01/2022 82     TRIGLYCERIDES (mg/dL)   Date Value   02/01/2022 272 (H)     Lab Results   Component Value Date     (H) 11/09/2020    A1C 6.4 (H) 08/26/2022     Lab Results   Component Value Date    T4F 1.0 11/07/2018    TSH 5.23 (H) 02/01/2022       No follow-ups on file.

## 2022-09-09 RX ORDER — INSULIN GLARGINE 100 [IU]/ML
INJECTION, SOLUTION SUBCUTANEOUS
Qty: 45 ML | Refills: 3 | Status: SHIPPED | OUTPATIENT
Start: 2022-09-09

## 2022-09-23 DIAGNOSIS — E78.2 MIXED HYPERLIPIDEMIA: ICD-10-CM

## 2022-09-23 RX ORDER — PRAVASTATIN SODIUM 80 MG/1
80 TABLET ORAL DAILY
Qty: 90 TABLET | Refills: 1 | Status: SHIPPED | OUTPATIENT
Start: 2022-09-23

## 2022-09-23 NOTE — TELEPHONE ENCOUNTER
Pravastatin: 3/28/22     Return in about 6 months (around 3/2/2023). Future appt:    Last Appointment with provider:   9/2/2022  [place] [facility]:  9/2/2022  CHOLESTEROL, TOTAL (mg/dL)   Date Value   02/01/2022 153     HDL CHOLESTEROL (mg/dL)   Date Value   02/01/2022 35 (L)     LDL-CHOLESTEROL (mg/dL (calc))   Date Value   02/01/2022 82     TRIGLYCERIDES (mg/dL)   Date Value   02/01/2022 272 (H)     Lab Results   Component Value Date     (H) 11/09/2020    A1C 6.4 (H) 08/26/2022     Lab Results   Component Value Date    T4F 1.0 11/07/2018    TSH 5.23 (H) 02/01/2022       No follow-ups on file.

## 2022-10-14 DIAGNOSIS — E11.22 TYPE 2 DIABETES MELLITUS WITH STAGE 3 CHRONIC KIDNEY DISEASE, WITH LONG-TERM CURRENT USE OF INSULIN (HCC): ICD-10-CM

## 2022-10-14 DIAGNOSIS — N18.30 TYPE 2 DIABETES MELLITUS WITH STAGE 3 CHRONIC KIDNEY DISEASE, WITH LONG-TERM CURRENT USE OF INSULIN (HCC): ICD-10-CM

## 2022-10-14 DIAGNOSIS — Z79.4 TYPE 2 DIABETES MELLITUS WITH STAGE 3 CHRONIC KIDNEY DISEASE, WITH LONG-TERM CURRENT USE OF INSULIN (HCC): ICD-10-CM

## 2022-10-14 RX ORDER — INSULIN LISPRO 100 [IU]/ML
INJECTION, SOLUTION INTRAVENOUS; SUBCUTANEOUS
Qty: 135 ML | Refills: 1 | Status: SHIPPED | OUTPATIENT
Start: 2022-10-14 | End: 2023-03-08

## 2022-10-14 NOTE — TELEPHONE ENCOUNTER
Future appt:    Last Appointment with provider:   9/2/2022 for diabetes follow up. Last appointment at [facility] [place]:  9/2/2022  CHOLESTEROL, TOTAL (mg/dL)   Date Value   02/01/2022 153     HDL CHOLESTEROL (mg/dL)   Date Value   02/01/2022 35 (L)     LDL-CHOLESTEROL (mg/dL (calc))   Date Value   02/01/2022 82     TRIGLYCERIDES (mg/dL)   Date Value   02/01/2022 272 (H)     Lab Results   Component Value Date     (H) 11/09/2020    A1C 6.4 (H) 08/26/2022     Lab Results   Component Value Date    T4F 1.0 11/07/2018    TSH 5.23 (H) 02/01/2022       No follow-ups on file.

## 2022-11-15 ENCOUNTER — TELEPHONE (OUTPATIENT)
Dept: FAMILY MEDICINE CLINIC | Facility: CLINIC | Age: 87
End: 2022-11-15

## 2022-11-15 DIAGNOSIS — I10 BENIGN ESSENTIAL HYPERTENSION: ICD-10-CM

## 2022-11-15 DIAGNOSIS — N18.31 TYPE 2 DIABETES MELLITUS WITH STAGE 3A CHRONIC KIDNEY DISEASE, WITH LONG-TERM CURRENT USE OF INSULIN (HCC): Primary | ICD-10-CM

## 2022-11-15 DIAGNOSIS — Z79.4 TYPE 2 DIABETES MELLITUS WITH STAGE 3A CHRONIC KIDNEY DISEASE, WITH LONG-TERM CURRENT USE OF INSULIN (HCC): Primary | ICD-10-CM

## 2022-11-15 DIAGNOSIS — E20.0 IDIOPATHIC HYPOPARATHYROIDISM (HCC): ICD-10-CM

## 2022-11-15 DIAGNOSIS — E11.22 TYPE 2 DIABETES MELLITUS WITH STAGE 3A CHRONIC KIDNEY DISEASE, WITH LONG-TERM CURRENT USE OF INSULIN (HCC): Primary | ICD-10-CM

## 2022-11-15 DIAGNOSIS — E78.2 MIXED HYPERLIPIDEMIA: ICD-10-CM

## 2022-11-15 NOTE — TELEPHONE ENCOUNTER
This is very frustrating. I will reenter the lab orders that I entered in September. These labs are to be done in March.

## 2022-12-07 RX ORDER — LANCETS
EACH MISCELLANEOUS
Qty: 300 EACH | Refills: 3 | Status: SHIPPED | OUTPATIENT
Start: 2022-12-07

## 2022-12-07 NOTE — TELEPHONE ENCOUNTER
Future appt: Your appointments     Date & Time Appointment Department [facility])    Mar 08, 2023  2:15 PM CST MA Supervisit with Vincenzo Graham MD [address], Caryle Flatter ([facility])            25 [facility] [phone]  [phone]        Last Appointment with provider:   9/2/2022  Last appointment at [facility]:  9/2/2022  CHOLESTEROL, TOTAL (mg/dL)   Date Value   02/01/2022 153     HDL CHOLESTEROL (mg/dL)   Date Value   02/01/2022 35 (L)     LDL-CHOLESTEROL (mg/dL (calc))   Date Value   02/01/2022 82     TRIGLYCERIDES (mg/dL)   Date Value   02/01/2022 272 (H)     Lab Results   Component Value Date     (H) 11/09/2020    A1C 6.4 (H) 08/26/2022     Lab Results   Component Value Date    T4F 1.0 11/07/2018    TSH 5.23 (H) 02/01/2022       No follow-ups on file.

## 2022-12-15 DIAGNOSIS — Z79.4 TYPE 2 DIABETES MELLITUS WITH STAGE 3 CHRONIC KIDNEY DISEASE, WITH LONG-TERM CURRENT USE OF INSULIN (HCC): ICD-10-CM

## 2022-12-15 DIAGNOSIS — E11.22 TYPE 2 DIABETES MELLITUS WITH STAGE 3 CHRONIC KIDNEY DISEASE, WITH LONG-TERM CURRENT USE OF INSULIN (HCC): ICD-10-CM

## 2022-12-15 DIAGNOSIS — N18.30 TYPE 2 DIABETES MELLITUS WITH STAGE 3 CHRONIC KIDNEY DISEASE, WITH LONG-TERM CURRENT USE OF INSULIN (HCC): ICD-10-CM

## 2022-12-15 NOTE — TELEPHONE ENCOUNTER
Insulin Pen Needle (BD PEN NEEDLE SHORT U/F) 31G X 8 MM Does not apply Misc     #400  R-3       Summary: use 4 times daily with insulin pens        Last refill- 11/10/22  Last PX- 2/21/22      Future appt: Your appointments     Date & Time Appointment Department [facility])    Mar 08, 2023  2:15 PM CST MA Supervisit with Stacy Schaffer MD 25 [facility] Margarita Rodrigues [facility])            [address] [place] [address] 1076 [phone]  [phone]        Last Appointment with provider:   9/2/2022  [facility] appointment [facility]:  9/2/2022  CHOLESTEROL, TOTAL (mg/dL)   Date Value   02/01/2022 153     HDL CHOLESTEROL (mg/dL)   Date Value   02/01/2022 35 (L)     LDL-CHOLESTEROL (mg/dL (calc))   Date Value   02/01/2022 82     TRIGLYCERIDES (mg/dL)   Date Value   02/01/2022 272 (H)     Lab Results   Component Value Date     (H) 11/09/2020    A1C 6.4 (H) 08/26/2022     Lab Results   Component Value Date    T4F 1.0 11/07/2018    TSH 5.23 (H) 02/01/2022       No follow-ups on file.

## 2022-12-16 RX ORDER — PEN NEEDLE, DIABETIC 31 GX5/16"
NEEDLE, DISPOSABLE MISCELLANEOUS
Qty: 400 EACH | Refills: 3 | Status: SHIPPED | OUTPATIENT
Start: 2022-12-16

## 2023-01-04 ENCOUNTER — OFFICE VISIT (OUTPATIENT)
Dept: FAMILY MEDICINE CLINIC | Facility: CLINIC | Age: 88
End: 2023-01-04
Payer: MEDICARE

## 2023-01-04 ENCOUNTER — HOSPITAL ENCOUNTER (OUTPATIENT)
Dept: GENERAL RADIOLOGY | Age: 88
Discharge: HOME | End: 2023-01-04
Attending: NURSE PRACTITIONER
Payer: MEDICARE

## 2023-01-04 VITALS
RESPIRATION RATE: 20 BRPM | BODY MASS INDEX: 36.37 KG/M2 | SYSTOLIC BLOOD PRESSURE: 132 MMHG | HEIGHT: 68 IN | DIASTOLIC BLOOD PRESSURE: 62 MMHG | TEMPERATURE: 97 F | OXYGEN SATURATION: 97 % | WEIGHT: 240 LBS | HEART RATE: 80 BPM

## 2023-01-04 DIAGNOSIS — R05.1 ACUTE COUGH: Primary | ICD-10-CM

## 2023-01-04 DIAGNOSIS — J32.0 LEFT MAXILLARY SINUSITIS: ICD-10-CM

## 2023-01-04 DIAGNOSIS — R05.1 ACUTE COUGH: ICD-10-CM

## 2023-01-04 PROCEDURE — 3075F SYST BP GE 130 - 139MM HG: CPT | Performed by: NURSE PRACTITIONER

## 2023-01-04 PROCEDURE — 87637 SARSCOV2&INF A&B&RSV AMP PRB: CPT | Performed by: NURSE PRACTITIONER

## 2023-01-04 PROCEDURE — 3008F BODY MASS INDEX DOCD: CPT | Performed by: NURSE PRACTITIONER

## 2023-01-04 PROCEDURE — 3078F DIAST BP <80 MM HG: CPT | Performed by: NURSE PRACTITIONER

## 2023-01-04 PROCEDURE — 99215 OFFICE O/P EST HI 40 MIN: CPT | Performed by: NURSE PRACTITIONER

## 2023-01-04 PROCEDURE — 71046 X-RAY EXAM CHEST 2 VIEWS: CPT | Performed by: NURSE PRACTITIONER

## 2023-01-04 RX ORDER — ASPIRIN 325 MG
325 TABLET ORAL DAILY
COMMUNITY

## 2023-01-04 RX ORDER — AMOXICILLIN 500 MG/1
500 CAPSULE ORAL 3 TIMES DAILY
Qty: 30 CAPSULE | Refills: 0 | Status: SHIPPED | OUTPATIENT
Start: 2023-01-04 | End: 2023-01-14

## 2023-01-04 RX ORDER — RAMIPRIL 10 MG/1
10 CAPSULE ORAL DAILY
COMMUNITY
Start: 2022-11-09

## 2023-01-04 RX ORDER — LORATADINE 10 MG/1
10 TABLET ORAL DAILY
COMMUNITY

## 2023-01-04 RX ORDER — BENZONATATE 100 MG/1
100 CAPSULE ORAL 3 TIMES DAILY PRN
Qty: 12 CAPSULE | Refills: 0 | Status: SHIPPED | OUTPATIENT
Start: 2023-01-04

## 2023-01-05 LAB
FLUAV + FLUBV RNA SPEC NAA+PROBE: NOT DETECTED
FLUAV + FLUBV RNA SPEC NAA+PROBE: NOT DETECTED
RSV RNA SPEC NAA+PROBE: NOT DETECTED
SARS-COV-2 RNA RESP QL NAA+PROBE: NOT DETECTED

## 2023-01-05 NOTE — PATIENT INSTRUCTIONS
Chest xray today, no acute pneumonia on xray    Viral swab today, results pending    Amoxicillin as ordered for sinus coverage  Tessalon every 8 hours if needed for cough; take with glass of water  Stay hydrated, rest alternating with activity, deep breathing exercises ten times an hour    Notify office/follow up if no improvement or worsening

## 2023-01-27 RX ORDER — LEVOTHYROXINE SODIUM 0.07 MG/1
75 TABLET ORAL
Qty: 90 TABLET | Refills: 1 | Status: SHIPPED | OUTPATIENT
Start: 2023-01-27

## 2023-01-27 NOTE — TELEPHONE ENCOUNTER
Levothyroxine: 8/3/22    Future appt: Your appointments     Date & Time Appointment Department [facility])    Mar 08, 2023  2:15 PM CST [place]visit [place] [address] [facility], Vee Cox ([facility])            [address] [place] [address] [facility] 1076 [phone]  [phone]        Last Appointment with provider:   9/2/2022  [facility] appointment [facility]:  1/4/2023  CHOLESTEROL, TOTAL (mg/dL)   Date Value   02/01/2022 153     HDL CHOLESTEROL (mg/dL)   Date Value   02/01/2022 35 (L)     LDL-CHOLESTEROL (mg/dL (calc))   Date Value   02/01/2022 82     TRIGLYCERIDES (mg/dL)   Date Value   02/01/2022 272 (H)     Lab Results   Component Value Date     (H) 11/09/2020    A1C 6.4 (H) 08/26/2022     Lab Results   Component Value Date    T4F 1.0 11/07/2018    TSH 5.23 (H) 02/01/2022       No follow-ups on file.

## 2023-02-24 ENCOUNTER — PATIENT MESSAGE (OUTPATIENT)
Dept: FAMILY MEDICINE CLINIC | Facility: CLINIC | Age: 88
End: 2023-02-24

## 2023-02-24 NOTE — TELEPHONE ENCOUNTER
From: Davion Tavera  To: Rashmi Caro MD  Sent: 2/24/2023 10:16 AM CST  Subject: Lab Request    I have an appointment on March 8 and have a lab scheduled at the local [facility] location on March 1. Could you make sure they have the lab request? I have lost my copy.   Thanks, Ermias Ariza

## 2023-03-02 LAB
ABSOLUTE BASOPHILS: 48 CELLS/UL (ref 0–200)
ABSOLUTE EOSINOPHILS: 221 CELLS/UL (ref 15–500)
ABSOLUTE LYMPHOCYTES: 1615 CELLS/UL (ref 850–3900)
ABSOLUTE MONOCYTES: 669 CELLS/UL (ref 200–950)
ABSOLUTE NEUTROPHILS: 4347 CELLS/UL (ref 1500–7800)
ALBUMIN/GLOBULIN RATIO: 1.6 (CALC) (ref 1–2.5)
ALBUMIN: 4.2 G/DL (ref 3.6–5.1)
ALKALINE PHOSPHATASE: 63 U/L (ref 35–144)
ALT: 56 U/L (ref 9–46)
AST: 29 U/L (ref 10–35)
BASOPHILS: 0.7 %
BILIRUBIN, TOTAL: 0.6 MG/DL (ref 0.2–1.2)
BUN: 21 MG/DL (ref 7–25)
CALCIUM: 9.4 MG/DL (ref 8.6–10.3)
CARBON DIOXIDE: 30 MMOL/L (ref 20–32)
CHLORIDE: 99 MMOL/L (ref 98–110)
CHOL/HDLC RATIO: 4.1 (CALC)
CHOLESTEROL, TOTAL: 143 MG/DL
CREATININE, RANDOM URINE: 136 MG/DL (ref 20–320)
CREATININE: 1.05 MG/DL (ref 0.7–1.22)
EGFR: 70 ML/MIN/1.73M2
EOSINOPHILS: 3.2 %
GLOBULIN: 2.6 G/DL (CALC) (ref 1.9–3.7)
GLUCOSE: 167 MG/DL (ref 65–99)
HDL CHOLESTEROL: 35 MG/DL
HEMATOCRIT: 48.5 % (ref 38.5–50)
HEMOGLOBIN A1C: 7.1 % OF TOTAL HGB
HEMOGLOBIN: 16.4 G/DL (ref 13.2–17.1)
LDL-CHOLESTEROL: 73 MG/DL (CALC)
LYMPHOCYTES: 23.4 %
MCH: 30.4 PG (ref 27–33)
MCHC: 33.8 G/DL (ref 32–36)
MCV: 89.8 FL (ref 80–100)
MICROALBUMIN/CREATININE RATIO, RANDOM URINE: 5 MCG/MG CREAT
MICROALBUMIN: 0.7 MG/DL
MONOCYTES: 9.7 %
MPV: 11.3 FL (ref 7.5–12.5)
NEUTROPHILS: 63 %
NON-HDL CHOLESTEROL: 108 MG/DL (CALC)
PLATELET COUNT: 210 THOUSAND/UL (ref 140–400)
POTASSIUM: 4.5 MMOL/L (ref 3.5–5.3)
PROTEIN, TOTAL: 6.8 G/DL (ref 6.1–8.1)
PSA, TOTAL: 0.04 NG/ML
RDW: 13.3 % (ref 11–15)
RED BLOOD CELL COUNT: 5.4 MILLION/UL (ref 4.2–5.8)
SODIUM: 138 MMOL/L (ref 135–146)
TRIGLYCERIDES: 268 MG/DL
TSH: 5.38 MIU/L (ref 0.4–4.5)
WHITE BLOOD CELL COUNT: 6.9 THOUSAND/UL (ref 3.8–10.8)

## 2023-03-08 ENCOUNTER — OFFICE VISIT (OUTPATIENT)
Dept: FAMILY MEDICINE CLINIC | Facility: CLINIC | Age: 88
End: 2023-03-08
Payer: MEDICARE

## 2023-03-08 VITALS
OXYGEN SATURATION: 98 % | TEMPERATURE: 98 F | SYSTOLIC BLOOD PRESSURE: 128 MMHG | HEART RATE: 62 BPM | WEIGHT: 248 LBS | DIASTOLIC BLOOD PRESSURE: 70 MMHG | HEIGHT: 68 IN | RESPIRATION RATE: 16 BRPM | BODY MASS INDEX: 37.59 KG/M2

## 2023-03-08 DIAGNOSIS — I65.22 OCCLUSION OF LEFT CAROTID ARTERY: ICD-10-CM

## 2023-03-08 DIAGNOSIS — E11.22 TYPE 2 DIABETES MELLITUS WITH STAGE 3A CHRONIC KIDNEY DISEASE, WITH LONG-TERM CURRENT USE OF INSULIN (HCC): ICD-10-CM

## 2023-03-08 DIAGNOSIS — N40.1 BENIGN PROSTATIC HYPERPLASIA WITH URINARY FREQUENCY: ICD-10-CM

## 2023-03-08 DIAGNOSIS — E20.0 IDIOPATHIC HYPOPARATHYROIDISM (HCC): ICD-10-CM

## 2023-03-08 DIAGNOSIS — E55.9 VITAMIN D DEFICIENCY: ICD-10-CM

## 2023-03-08 DIAGNOSIS — Z79.4 TYPE 2 DIABETES MELLITUS WITH STAGE 3A CHRONIC KIDNEY DISEASE, WITH LONG-TERM CURRENT USE OF INSULIN (HCC): ICD-10-CM

## 2023-03-08 DIAGNOSIS — E78.2 MIXED HYPERLIPIDEMIA: ICD-10-CM

## 2023-03-08 DIAGNOSIS — L57.0 ACTINIC KERATOSIS: ICD-10-CM

## 2023-03-08 DIAGNOSIS — Z95.5 PRESENCE OF STENT IN CORONARY ARTERY: ICD-10-CM

## 2023-03-08 DIAGNOSIS — G62.9 NEUROPATHY: ICD-10-CM

## 2023-03-08 DIAGNOSIS — I10 BENIGN ESSENTIAL HYPERTENSION: ICD-10-CM

## 2023-03-08 DIAGNOSIS — R42 DIZZINESS: ICD-10-CM

## 2023-03-08 DIAGNOSIS — R35.0 BENIGN PROSTATIC HYPERPLASIA WITH URINARY FREQUENCY: ICD-10-CM

## 2023-03-08 DIAGNOSIS — E03.9 ACQUIRED HYPOTHYROIDISM: ICD-10-CM

## 2023-03-08 DIAGNOSIS — Z98.61 POSTSURGICAL PERCUTANEOUS TRANSLUMINAL CORONARY ANGIOPLASTY STATUS: ICD-10-CM

## 2023-03-08 DIAGNOSIS — N18.31 STAGE 3A CHRONIC KIDNEY DISEASE (HCC): ICD-10-CM

## 2023-03-08 DIAGNOSIS — E66.01 CLASS 2 SEVERE OBESITY DUE TO EXCESS CALORIES WITH SERIOUS COMORBIDITY AND BODY MASS INDEX (BMI) OF 37.0 TO 37.9 IN ADULT (HCC): ICD-10-CM

## 2023-03-08 DIAGNOSIS — I25.10 ATHEROSCLEROSIS OF NATIVE CORONARY ARTERY OF NATIVE HEART WITHOUT ANGINA PECTORIS: ICD-10-CM

## 2023-03-08 DIAGNOSIS — I25.9 CHRONIC ISCHEMIC HEART DISEASE: ICD-10-CM

## 2023-03-08 DIAGNOSIS — N18.31 TYPE 2 DIABETES MELLITUS WITH STAGE 3A CHRONIC KIDNEY DISEASE, WITH LONG-TERM CURRENT USE OF INSULIN (HCC): ICD-10-CM

## 2023-03-08 DIAGNOSIS — Z00.00 ENCOUNTER FOR ANNUAL HEALTH EXAMINATION: Primary | ICD-10-CM

## 2023-03-08 PROBLEM — R06.00 DYSPNEA: Status: RESOLVED | Noted: 2022-04-21 | Resolved: 2023-03-08

## 2023-03-08 RX ORDER — PEN NEEDLE, DIABETIC 31 GX5/16"
NEEDLE, DISPOSABLE MISCELLANEOUS
Qty: 400 EACH | Refills: 3 | Status: SHIPPED | OUTPATIENT
Start: 2023-03-08

## 2023-03-08 RX ORDER — AMLODIPINE BESYLATE 2.5 MG/1
2.5 TABLET ORAL DAILY
Qty: 90 TABLET | Refills: 3 | Status: SHIPPED | OUTPATIENT
Start: 2023-03-08

## 2023-03-08 RX ORDER — HYDROCHLOROTHIAZIDE 25 MG/1
25 TABLET ORAL DAILY
Qty: 90 TABLET | Refills: 3 | Status: SHIPPED | OUTPATIENT
Start: 2023-03-08

## 2023-03-08 RX ORDER — METOPROLOL SUCCINATE 50 MG/1
50 TABLET, EXTENDED RELEASE ORAL 2 TIMES DAILY
Qty: 180 TABLET | Refills: 3 | Status: SHIPPED | OUTPATIENT
Start: 2023-03-08

## 2023-03-08 RX ORDER — INSULIN GLARGINE 100 [IU]/ML
50 INJECTION, SOLUTION SUBCUTANEOUS NIGHTLY
Qty: 45 ML | Refills: 3 | Status: SHIPPED | OUTPATIENT
Start: 2023-03-08

## 2023-03-08 RX ORDER — INSULIN LISPRO 100 [IU]/ML
INJECTION, SOLUTION INTRAVENOUS; SUBCUTANEOUS
Qty: 135 ML | Refills: 3 | Status: SHIPPED | OUTPATIENT
Start: 2023-03-08

## 2023-03-08 RX ORDER — PRAVASTATIN SODIUM 80 MG/1
80 TABLET ORAL DAILY
Qty: 90 TABLET | Refills: 3 | Status: SHIPPED | OUTPATIENT
Start: 2023-03-08

## 2023-03-08 RX ORDER — RAMIPRIL 10 MG/1
10 CAPSULE ORAL DAILY
Qty: 90 CAPSULE | Refills: 3 | Status: SHIPPED | OUTPATIENT
Start: 2023-03-08

## 2023-03-08 RX ORDER — FINASTERIDE 5 MG/1
5 TABLET, FILM COATED ORAL DAILY
Qty: 90 TABLET | Refills: 3 | Status: SHIPPED | OUTPATIENT
Start: 2023-03-08

## 2023-03-08 RX ORDER — CLOPIDOGREL BISULFATE 75 MG/1
75 TABLET ORAL DAILY
Qty: 90 TABLET | Refills: 3 | Status: SHIPPED | OUTPATIENT
Start: 2023-03-08

## 2023-03-08 RX ORDER — TRIAMCINOLONE ACETONIDE 1 MG/G
CREAM TOPICAL
COMMUNITY
Start: 2023-02-23

## 2023-03-08 RX ORDER — LEVOTHYROXINE SODIUM 0.07 MG/1
75 TABLET ORAL
Qty: 90 TABLET | Refills: 3 | Status: SHIPPED | OUTPATIENT
Start: 2023-03-08

## 2023-03-08 RX ORDER — BLOOD SUGAR DIAGNOSTIC
STRIP MISCELLANEOUS
Qty: 300 STRIP | Refills: 3 | Status: SHIPPED | OUTPATIENT
Start: 2023-03-08

## 2023-04-10 ENCOUNTER — PATIENT MESSAGE (OUTPATIENT)
Dept: FAMILY MEDICINE CLINIC | Facility: CLINIC | Age: 88
End: 2023-04-10

## 2023-04-10 RX ORDER — BLOOD SUGAR DIAGNOSTIC
STRIP MISCELLANEOUS
Qty: 300 STRIP | Refills: 3 | Status: SHIPPED | OUTPATIENT
Start: 2023-04-10

## 2023-04-10 RX ORDER — BLOOD-GLUCOSE METER
3 EACH MISCELLANEOUS DAILY
COMMUNITY
End: 2023-04-10

## 2023-04-10 RX ORDER — BLOOD-GLUCOSE METER
EACH MISCELLANEOUS
Qty: 1 KIT | Refills: 0 | Status: SHIPPED | OUTPATIENT
Start: 2023-04-10 | End: 2023-04-10 | Stop reason: ALTCHOICE

## 2023-04-10 RX ORDER — BLOOD SUGAR DIAGNOSTIC
STRIP MISCELLANEOUS
Qty: 300 STRIP | Refills: 3 | Status: SHIPPED | OUTPATIENT
Start: 2023-04-10 | End: 2023-04-10 | Stop reason: ALTCHOICE

## 2023-04-10 RX ORDER — LANCETS
EACH MISCELLANEOUS
Qty: 300 EACH | Refills: 3 | Status: SHIPPED | OUTPATIENT
Start: 2023-04-10 | End: 2023-04-10 | Stop reason: ALTCHOICE

## 2023-04-10 RX ORDER — BLOOD-GLUCOSE METER
1 EACH MISCELLANEOUS 3 TIMES DAILY
Qty: 1 KIT | Refills: 0 | Status: SHIPPED | OUTPATIENT
Start: 2023-04-10

## 2023-04-10 RX ORDER — LANCETS
1 EACH MISCELLANEOUS 3 TIMES DAILY
COMMUNITY

## 2023-04-10 RX ORDER — BLOOD-GLUCOSE METER
1 EACH MISCELLANEOUS 3 TIMES DAILY
COMMUNITY
End: 2023-04-10

## 2023-04-10 RX ORDER — BLOOD SUGAR DIAGNOSTIC
3 STRIP MISCELLANEOUS DAILY
COMMUNITY
End: 2023-04-10

## 2023-04-10 NOTE — TELEPHONE ENCOUNTER
From: Prerna Mayorga  To: Pablito Murray MD  Sent: 4/10/2023 9:31 AM CDT  Subject: Glucometer virginia and strips    My virginia, acquired in , has . When attempting to get a new one, [place] said I needed to contact you as a part of the replacement process. They said my virginia needs to be replaced with a new model and strips. When they call you, please tell them it is josselin Bonds  1935

## 2023-07-28 ENCOUNTER — PATIENT MESSAGE (OUTPATIENT)
Dept: FAMILY MEDICINE CLINIC | Facility: CLINIC | Age: 88
End: 2023-07-28

## 2023-07-28 NOTE — TELEPHONE ENCOUNTER
My recommendation is for you to see Mkie Graves for your follow-up visits in the years to come. When you come for your visit in September we will discuss this further at that time.

## 2023-07-28 NOTE — TELEPHONE ENCOUNTER
From: Ralph Hernandez  To: Sharyle Scrape, MD  Sent: 7/28/2023 9:13 AM CDT  Subject: New doctor for Nimisha Cummings and for me    Dr. Pathak Led:    I am scheduled for an appointment with you on September 8, which I will keep. Both Leticia and I will want to sign up with new doctors for when you retire. Juli Foley had told you we were going elsewhere but has changed her mind.) Could (would) you assign us new doctors and perhaps set up the next appointment for Nimisha Cummings.    Thanks,    Karen Riggs

## 2023-08-09 NOTE — TELEPHONE ENCOUNTER
Levothyroxine: 8/6/21     Return in about 6 months (around 2/28/2022). Future appt: Your appointments     Date & Time Appointment Department [facility])    Feb 21, 2022 10:30 AM OFELIA QUARLES Supervisit with Ryne Grace MD [facility], [facility])            [address] [place] [address] Sofie  [facility] [address] [id number]  [phone]        Last Appointment with provider:   8/31/2021  Last appointment [place] [facility] [place]:  8/31/2021  CHOLESTEROL, TOTAL (mg/dL)   Date Value   02/01/2022 153     HDL CHOLESTEROL (mg/dL)   Date Value   02/01/2022 35 (L)     LDL-CHOLESTEROL (mg/dL (calc))   Date Value   02/01/2022 82     TRIGLYCERIDES (mg/dL)   Date Value   02/01/2022 272 (H)     Lab Results   Component Value Date     (H) 11/09/2020    A1C 6.8 (H) 02/01/2022     Lab Results   Component Value Date    T4F 1.0 11/07/2018    TSH 5.23 (H) 02/01/2022       No follow-ups on file. Radiation Oncology Follow-Up      Patient Name:  Jessika eNss  YOB: 1969  MRN:  [id number]  Date of Service: 8/11/2023   Referring Physician:  Tomasz Méndez MD  Dictating Physician:  Emma Bernard MD    Diagnosis:  (C50.411,  Z17.0) Malignant neoplasm of upper-outer quadrant of right breast in female, estrogen receptor positive (CMD)     Cancer Staging   Malignant neoplasm of upper-outer quadrant of right breast in female, estrogen receptor positive (CMD)  Staging form: Breast, AJCC 8th Edition  - Pathologic stage from 4/16/2020: Stage IA (pT1c, pN0(sn), cM0, G2, ER+, NM+, HER2-, Oncotype DX score: 11) - Signed by Wing Drummond MD on 10/25/2020      TREATMENT RENDERED:    Radiation Treatments     Active   No active radiation treatments to show.   Historical   right TB (Started on 7/6/2020)   Most recent fraction: 250 cGy given on 7/9/2020   Total given: 1,000 cGy / 1,000 cGy  (4 of 4 fractions)   Elapsed Days: 3   Technique: 3-FIELD      right breast (Started on 6/11/2020)   Most recent fraction: 266 cGy given on 7/2/2020   Total given: 4,256 cGy / 4,256 cGy  (16 of 16 fractions)   Elapsed Days: 21   Technique: TANGENTS                   History of Present Illness:    Jessika Ness is a 53 year old female who presented with a palpable mass in the right breast in 3/2020.     Mammogram on 3/24/2020 showed   Several new fine calcifications are identified in the posterior depth right breast at approximately 9-10:00. At this location there is question of architectural distortion, seen best on tomosynthesis. No other suspicious finding is detected on mammography.    Ultrasound: In the right breast at 9:00, 6 cm from the nipple posterior depth, is a taller than wide hypoechoic probably spiculated mass measuring 1.1 x 0.9 x 0.4 cm. This correlates to the patient's palpable lump and the findings on mammography. Biopsy on 3/31/2020 showed IDC. ER 95% % Her2- Ki67 15% (per Dr Drummond's  note)     She had a high risk mammaprint     04/16/20 lumpectomy and SLN biopsy showed   A: Right breast; lumpectomy:   - Invasive ductal carcinoma, grade 2. See staging summary below.   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   - Prior biopsy site identified.   - Invasive carcinoma is 2 mm from closest superior surgical margin.   - DCIS focally involves inferior margin and is less than 1 mm from superior   and posterior surgical margins.   B: Right breast skin; excision:   - Benign skin with no diagnostic abnormality.   C: Right sentinel lymph nodes x2; excision:   - Two lymph nodes, negative for metastatic carcinoma.   D: Right breast, new upper half margin; excision:   - Benign breast tissue with patchy stromal fibrosis, apocrine metaplasia and   columnar cell change without atypia.   - New upper half margin negative for malignancy.   E: Right breast, new lower half margin; excision:   - Benign breast tissue with patchy stromal fibrosis and usual duct   hyperplasia.   - New lower half margin negative for malignancy.   F: Left breast skin and tissue; excision/plasty:   - Benign breast tissue with patchy stromal fibrosis, columnar cell change   without atypia, and cysts.   - Benign skin with no diagnostic abnormality.     Diagnosis Comment:   BREAST Invasive Carcinoma, Resection   CLINICAL   Clinical History: Palpable mass   Radiologic Finding: Mass or architectural distortion   SPECIMEN   Procedure: Excision (less than total mastectomy)   Specimen Laterality: Right   TUMOR   Tumor Site: Not specified   Histologic Type: Invasive carcinoma of no special type (invasive ductal   carcinoma, not otherwise specified)   Glandular (Acinar) / Tubular Differentiation: Score 3   Nuclear Pleomorphism: Score 2   Mitotic Rate: Score 2   Overall Grade: Grade 2 (scores of 6 or 7)   Tumor Size: 19 x 18 x 9 mm   Tumor Focality: Single focus of invasive carcinoma   Ductal Carcinoma In Situ  (DCIS): Present   Ductal Carcinoma In Situ (DCIS): Positive for extensive intraductal   component (EIC)   Size (Extent) of DCIS: Approximately 45 mm (DCIS (from medial to lateral   aspect of sample)   Number of Blocks with DCIS: 8   Number of Blocks Examined: 8   Architectural Patterns: Cribriform, Comedo, Solid   Nuclear Grade: Grade III (high)   Necrosis: Present, central (expansive \"comedo\" necrosis)   Lobular Carcinoma In Situ (LCIS): No LCIS in specimen   Tumor Extent   Skin:   Lymphovascular Invasion: Not identified   Dermal Lymphovascular Invasion: Not identified   Microcalcifications: Not identified   Treatment Effect: No known presurgical therapy   MARGINS   Invasive Carcinoma Margins: Uninvolved by invasive carcinoma   Distance from Closest Margin (Millimeters): 8 mm   Closest Margin: \"New upper half margin\"   DCIS Margins: Uninvolved by DCIS   Distance from Closest Margin (Millimeters): 7 mm   Closest Margin: \"New lower half margin\"   LYMPH NODES   Regional Lymph Nodes: Uninvolved by tumor cells   Number of Lymph Nodes Examined: 2   Number of [place] Nodes Examined: 2   PATHOLOGIC STAGE CLASSIFICATION (pTNM, AJCC 8th Edition)   Primary Tumor (pT): pT1c: Tumor > 10 mm but <= 20 mm in greatest dimension   Regional Lymph Nodes (pN)   Modifier: (sn): Only sentinel node(s) evaluated.   Category (pN): pN0: No regional lymph node metastasis identified or ITCs   only     Oncotype was low risk with recurrence score of 11.     Completed whole breast RT     Had US due to pelvic pain. And US on 7/3/2020 was unremarkable     Mammogram September 30, 2020   The post-surgical scar in the right breast is probably benign.  A follow-up in 6 months is recommended.  A follow-up right mammogram in 6 months is recommended to demonstrate stability.      BILATERAL DIGITAL DIAGNOSTIC MAMMOGRAM 3D/2D: 3/22/2021  The focal asymmetry in the right breast most likely is a post-lumpectomy scar and is probably benign.  A follow-up right  mammogram in 6 months is recommended to demonstrate stability.     Pelvic US performed for abdominal pain on 9/17/2021 showing 2 cm complex, avascular right ovarian lesion could reflect a hemorrhagic cyst. 6-12 mo follow up recommended. 2.2 cm simple right ovarian cyst noted. Follow-up US performed on 2/4/2022 showed multiple simple cysts with previously noted hemorrhagic right ovarian cyst no longer visualized.     MAMMO DIAGNOSTIC BILATERAL W ALANA 3/24/2022:  No evidence of discrete cyst or solid mass on both mammographic and sonographic evaluation corresponding with pain. If there is a persistent palpable mass, biopsy should be considered.  The clustered spherical calcifications in the right breast at 11 o'clock middle depth are at a low suspicion for malignancy.  A vacuum biopsy is recommended.  The focal asymmetry in the right breast at 11 o'clock middle depth most likely is a post-lumpectomy scar and is probably benign.     US BREAST DIAGNOSTIC ALL 4 QUADRANTS BILATERAL 3/24/2022:  No evidence of discrete cyst or solid mass corresponding with area of pain. If there is a persistent palpable abnormality, biopsy should be considered.    The 3 mm simple cyst in the right breast is benign.      MAMMO STEREO BREAST BIOPSY RIGHT 4/7/2022:  Right breast calcifications at 11:00, middle depth, stereotactic core biopsy:   -Organizing fat necrosis, negative for malignancy    Getting massage for left breast pain.    10/10/2022 MAMMO DIAGNOSTIC RIGHT W ALANA:  The asymmetry in the right breast likely represents a post-lumpectomy scar and is benign.    There is no mammographic evidence of malignancy. Return to annual mammogram screening schedule is recommended.      6/26/2023 MAMMO SCREENING BILATERAL W ALANA:  Scattered calcifications are present in the right breast.  There are post-op changes associated with the right breast.  Examination indicates a biopsy marker in the right breast.    There is an asymmetry with  calcifications in the right breast superior medial quadrant middle depth.      7/3/2023 MAMMO DIAGNOSTIC RIGHT W ALANA /US BREAST DIAGNOSTIC RIGHT:  The focal asymmetry in the right breast likely represents a post-surgical scar and is probably benign.    The area in the right breast likely represents a scar and is probably benign.    A follow-up mammogram and an ultrasound in 6 months is recommended to demonstrate stability.      On tamoxifen, tolerating well.  Has occasional right breast discomfort if she exercises.     Reports slight SOB with exertion and an occasional cough at night. States it is better if she lays on her stomach. She wonders if it could be anxiety.    Patient is here for follow-up.           PHYSICAL EXAM:   The Karnofsky performance scale today is 100, Fully active, able to carry on all pre-disease performed without restriction (ECOG equivalent 0).   Vitals:    08/11/23 1505   BP: 120/68   Pulse: 99   Temp: 98.3 °F (36.8 °C)   SpO2: 99%   Weight: 85.9 kg (189 lb 6 oz)   PainSc:  0     General:   Alert, oriented X3 and in no apparent distress  Cardiovascular:  S1S2 Regular rate and rhythm without murmur.  Respiratory:   Normal respiratory effort.  Clear to auscultation bilaterally.  No wheezes, rubs or rhonchi.  Lymphatic: no palpable, cervical, supraclavicular or axillary adenopathy bilaterally  Breast: Well healed scars bilaterally.  No tenderness with palpation. No suspicious  masses in either breast.   There are keloids/hypertrophic scars on the left.     Neurologic:   Cranial nerves 2-12 are intact.    Psychiatric:   Cooperative.  Appropriate mood and affect.  Normal judgment.    IMPRESSION:    Jessika Ness is a 53 year old female with a right breast invasive ductal carcinoma, T 1c N0 M0 Estrogen receptor positive, Progesterone receptor positive, and HER-2/dexter non-amplified s/p lumpectomy and SLN biopsy. s/p whole breast RT with boost . No  radiographic evidence of malignancy.  She remains  on Tamoxifen.      She has some mild SOB with exertion and a cough at night.     PLAN    CT chest - will call with results.  She will return for follow up in 12 months.    A total of 20 minutes was spent during this encounter, including reviewing pertinent imaging and pathology records and other physician notes as summarized in the narrative, face-to-face time spent with the patient, documentation, and coordinating care.    Emma Bernard MD  8/11/2023        Note to patient: The 21st Century Cures Act makes medical notes like these available to patients in the interest of transparency. However, be advised this is a medical document. It is intended as peer to peer communication. It is written in medical language and may contain abbreviations or verbiage that are unfamiliar. It may appear blunt or direct. Medical documents are intended to carry relevant information, facts as evident, and the clinical opinion of the practitioner.

## 2023-08-31 ENCOUNTER — PATIENT MESSAGE (OUTPATIENT)
Dept: FAMILY MEDICINE CLINIC | Facility: CLINIC | Age: 88
End: 2023-08-31

## 2023-09-06 LAB
ALBUMIN/GLOBULIN RATIO: 1.7 (CALC) (ref 1–2.5)
ALBUMIN: 4.3 G/DL (ref 3.6–5.1)
ALKALINE PHOSPHATASE: 57 U/L (ref 35–144)
ALT: 47 U/L (ref 9–46)
AST: 23 U/L (ref 10–35)
BILIRUBIN, TOTAL: 0.6 MG/DL (ref 0.2–1.2)
BUN: 22 MG/DL (ref 7–25)
CALCIUM: 9.6 MG/DL (ref 8.6–10.3)
CARBON DIOXIDE: 29 MMOL/L (ref 20–32)
CHLORIDE: 101 MMOL/L (ref 98–110)
CREATININE: 1.13 MG/DL (ref 0.7–1.22)
EGFR: 63 ML/MIN/1.73M2
GLOBULIN: 2.5 G/DL (CALC) (ref 1.9–3.7)
GLUCOSE: 145 MG/DL (ref 65–99)
HEMOGLOBIN A1C: 6.9 % OF TOTAL HGB
POTASSIUM: 4.3 MMOL/L (ref 3.5–5.3)
PROTEIN, TOTAL: 6.8 G/DL (ref 6.1–8.1)
SODIUM: 137 MMOL/L (ref 135–146)

## 2023-09-08 ENCOUNTER — OFFICE VISIT (OUTPATIENT)
Dept: FAMILY MEDICINE CLINIC | Facility: CLINIC | Age: 88
End: 2023-09-08
Payer: MEDICARE

## 2023-09-08 VITALS
RESPIRATION RATE: 16 BRPM | TEMPERATURE: 97 F | SYSTOLIC BLOOD PRESSURE: 126 MMHG | DIASTOLIC BLOOD PRESSURE: 68 MMHG | WEIGHT: 241.63 LBS | HEART RATE: 66 BPM | HEIGHT: 68 IN | OXYGEN SATURATION: 97 % | BODY MASS INDEX: 36.62 KG/M2

## 2023-09-08 DIAGNOSIS — E66.01 CLASS 2 SEVERE OBESITY DUE TO EXCESS CALORIES WITH SERIOUS COMORBIDITY AND BODY MASS INDEX (BMI) OF 36.0 TO 36.9 IN ADULT: ICD-10-CM

## 2023-09-08 DIAGNOSIS — E11.22 TYPE 2 DIABETES MELLITUS WITH STAGE 3A CHRONIC KIDNEY DISEASE, WITH LONG-TERM CURRENT USE OF INSULIN (HCC): Primary | ICD-10-CM

## 2023-09-08 DIAGNOSIS — E03.9 ACQUIRED HYPOTHYROIDISM: ICD-10-CM

## 2023-09-08 DIAGNOSIS — N18.31 TYPE 2 DIABETES MELLITUS WITH STAGE 3A CHRONIC KIDNEY DISEASE, WITH LONG-TERM CURRENT USE OF INSULIN (HCC): Primary | ICD-10-CM

## 2023-09-08 DIAGNOSIS — Z79.4 TYPE 2 DIABETES MELLITUS WITH STAGE 3A CHRONIC KIDNEY DISEASE, WITH LONG-TERM CURRENT USE OF INSULIN (HCC): Primary | ICD-10-CM

## 2023-09-08 PROCEDURE — 1160F RVW MEDS BY RX/DR IN RCRD: CPT | Performed by: FAMILY MEDICINE

## 2023-09-08 PROCEDURE — 1126F AMNT PAIN NOTED NONE PRSNT: CPT | Performed by: FAMILY MEDICINE

## 2023-09-08 PROCEDURE — 1159F MED LIST DOCD IN RCRD: CPT | Performed by: FAMILY MEDICINE

## 2023-09-08 PROCEDURE — 3078F DIAST BP <80 MM HG: CPT | Performed by: FAMILY MEDICINE

## 2023-09-08 PROCEDURE — 1170F FXNL STATUS ASSESSED: CPT | Performed by: FAMILY MEDICINE

## 2023-09-08 PROCEDURE — 3008F BODY MASS INDEX DOCD: CPT | Performed by: FAMILY MEDICINE

## 2023-09-08 PROCEDURE — 3074F SYST BP LT 130 MM HG: CPT | Performed by: FAMILY MEDICINE

## 2023-09-08 PROCEDURE — 99214 OFFICE O/P EST MOD 30 MIN: CPT | Performed by: FAMILY MEDICINE

## 2023-09-08 NOTE — PATIENT INSTRUCTIONS
Continue the same medications. Recheck in 6 months with Dr Iam Villa. Recommend a grab bar in the bathroom near the toilet.

## 2025-07-28 NOTE — TELEPHONE ENCOUNTER
Future appt:     Your appointments     Date & Time Appointment Department [facility])    Feb 26, 2021  2:00 PM CST MA Supervisit with Fanny Medina MD [address] [place] [address] [place]

## (undated) DIAGNOSIS — E78.2 MIXED HYPERLIPIDEMIA: ICD-10-CM

## (undated) DIAGNOSIS — Z79.4 TYPE 2 DIABETES MELLITUS WITH STAGE 3 CHRONIC KIDNEY DISEASE, WITH LONG-TERM CURRENT USE OF INSULIN (HCC): ICD-10-CM

## (undated) DIAGNOSIS — N18.30 TYPE 2 DIABETES MELLITUS WITH STAGE 3 CHRONIC KIDNEY DISEASE, WITH LONG-TERM CURRENT USE OF INSULIN (HCC): ICD-10-CM

## (undated) DIAGNOSIS — E11.22 TYPE 2 DIABETES MELLITUS WITH STAGE 3 CHRONIC KIDNEY DISEASE, WITH LONG-TERM CURRENT USE OF INSULIN (HCC): ICD-10-CM

## (undated) NOTE — MR AVS SNAPSHOT
[address]  [place] [address] [phone]  [phone]               Thank you for choosing us for your health care visit with Ty Lyman MD.  We are glad to serve you and happy to provide you with this summary of taking this medication, and follow the directions you see here. azithromycin 250 MG Tabs   Take two tablets by mouth today, then one tablet daily.    Commonly known as:  ZITHROMAX Z-TOBIN           BD PEN NEEDLE SHORT U/F 31G X 8 MM Misc   Generic d What changed:  Another medication with the same name was removed. Continue taking this medication, and follow the directions you see here. Commonly known as:  ACCUPRIL           Vitamin D 2000 units Tabs   Take 2,000 Units by mouth daily.  Dr. Melinda Sauceda ? Use non skid mats only. ? Clean up spills as soon as they happen. ? Keep objects that you use often within easy reach. BATHROOM:  ? Install grab bars on the bathroom walls beside the tub, shower and toilet. ?  Use a non skid rubber mat in the tub/sh including white bread, rice and pasta   Eat plenty of protein, keep the fat content low Sugars:  sodas and sports drinks, candies and desserts   Eat plenty of low-fat dairy products High fat meats and dairy   Choose whole grain products Foods high in sodiu

## (undated) NOTE — LETTER
09/23/19        [address] [facility]      Dear Tiffanie Troy records indicate that you have outstanding lab work and or testing that was ordered for you and has not yet been completed:  Orders Placed This Encounter      CMP